# Patient Record
Sex: FEMALE | Race: ASIAN | Employment: OTHER | URBAN - METROPOLITAN AREA
[De-identification: names, ages, dates, MRNs, and addresses within clinical notes are randomized per-mention and may not be internally consistent; named-entity substitution may affect disease eponyms.]

---

## 2019-01-01 ENCOUNTER — APPOINTMENT (OUTPATIENT)
Dept: GENERAL RADIOLOGY | Age: 74
DRG: 871 | End: 2019-01-01
Attending: INTERNAL MEDICINE
Payer: MEDICARE

## 2019-01-01 ENCOUNTER — APPOINTMENT (OUTPATIENT)
Dept: GENERAL RADIOLOGY | Age: 74
End: 2019-01-01
Attending: INTERNAL MEDICINE

## 2019-01-01 ENCOUNTER — HOSPITAL ENCOUNTER (OUTPATIENT)
Age: 74
Discharge: ANOTHER HOSPITAL | End: 2019-10-14
Attending: INTERNAL MEDICINE | Admitting: INTERNAL MEDICINE

## 2019-01-01 ENCOUNTER — HOSPITAL ENCOUNTER (INPATIENT)
Age: 74
LOS: 5 days | DRG: 871 | End: 2019-10-19
Attending: INTERNAL MEDICINE | Admitting: INTERNAL MEDICINE
Payer: MEDICARE

## 2019-01-01 VITALS
TEMPERATURE: 97.7 F | HEIGHT: 63 IN | SYSTOLIC BLOOD PRESSURE: 59 MMHG | DIASTOLIC BLOOD PRESSURE: 39 MMHG | RESPIRATION RATE: 30 BRPM | BODY MASS INDEX: 35.94 KG/M2 | WEIGHT: 202.82 LBS | OXYGEN SATURATION: 74 %

## 2019-01-01 DIAGNOSIS — E87.0 HYPERNATREMIA: ICD-10-CM

## 2019-01-01 DIAGNOSIS — R50.9 FEVER, UNSPECIFIED FEVER CAUSE: ICD-10-CM

## 2019-01-01 DIAGNOSIS — I27.20 PULMONARY HYPERTENSION (HCC): Chronic | ICD-10-CM

## 2019-01-01 DIAGNOSIS — I48.91 NEW ONSET ATRIAL FIBRILLATION (HCC): ICD-10-CM

## 2019-01-01 DIAGNOSIS — M34.9 SCLERODERMA (HCC): Chronic | ICD-10-CM

## 2019-01-01 DIAGNOSIS — G62.81 CRITICAL ILLNESS POLYNEUROPATHY (HCC): ICD-10-CM

## 2019-01-01 DIAGNOSIS — G35 MULTIPLE SCLEROSIS (HCC): Chronic | ICD-10-CM

## 2019-01-01 DIAGNOSIS — J18.9 PNEUMONIA DUE TO INFECTIOUS ORGANISM, UNSPECIFIED LATERALITY, UNSPECIFIED PART OF LUNG: ICD-10-CM

## 2019-01-01 DIAGNOSIS — J96.01 ACUTE RESPIRATORY FAILURE WITH HYPOXIA (HCC): ICD-10-CM

## 2019-01-01 DIAGNOSIS — Z86.711 HISTORY OF PULMONARY EMBOLISM: ICD-10-CM

## 2019-01-01 DIAGNOSIS — N17.9 AKI (ACUTE KIDNEY INJURY) (HCC): ICD-10-CM

## 2019-01-01 DIAGNOSIS — G47.33 OSA (OBSTRUCTIVE SLEEP APNEA): Chronic | ICD-10-CM

## 2019-01-01 DIAGNOSIS — J80 ARDS (ADULT RESPIRATORY DISTRESS SYNDROME) (HCC): ICD-10-CM

## 2019-01-01 DIAGNOSIS — B34.8 RHINOVIRUS INFECTION: ICD-10-CM

## 2019-01-01 LAB
1,3 BETA GLUCAN SER-MCNC: 45 PG/ML
ABO + RH BLD: NORMAL
ADMINISTERED INITIALS, ADMINIT: NORMAL
ALBUMIN SERPL-MCNC: 1.5 G/DL (ref 3.2–4.6)
ALBUMIN SERPL-MCNC: 1.6 G/DL (ref 3.2–4.6)
ALBUMIN SERPL-MCNC: 1.7 G/DL (ref 3.2–4.6)
ALBUMIN/GLOB SERPL: 0.3 {RATIO} (ref 1.2–3.5)
ALP SERPL-CCNC: 129 U/L (ref 50–136)
ALP SERPL-CCNC: 151 U/L (ref 50–136)
ALP SERPL-CCNC: 152 U/L (ref 50–136)
ALP SERPL-CCNC: 171 U/L (ref 50–136)
ALP SERPL-CCNC: 197 U/L (ref 50–136)
ALT SERPL-CCNC: 10 U/L (ref 12–65)
ALT SERPL-CCNC: 11 U/L (ref 12–65)
ALT SERPL-CCNC: 11 U/L (ref 12–65)
ALT SERPL-CCNC: 12 U/L (ref 12–65)
ALT SERPL-CCNC: 8 U/L (ref 12–65)
AMORPH CRY URNS QL MICRO: ABNORMAL
AMORPH CRY URNS QL MICRO: ABNORMAL
ANION GAP SERPL CALC-SCNC: 10 MMOL/L (ref 7–16)
ANION GAP SERPL CALC-SCNC: 11 MMOL/L (ref 7–16)
ANION GAP SERPL CALC-SCNC: 11 MMOL/L (ref 7–16)
ANION GAP SERPL CALC-SCNC: 12 MMOL/L (ref 7–16)
ANION GAP SERPL CALC-SCNC: 12 MMOL/L (ref 7–16)
ANION GAP SERPL CALC-SCNC: 13 MMOL/L (ref 7–16)
ANION GAP SERPL CALC-SCNC: 5 MMOL/L (ref 7–16)
ANION GAP SERPL CALC-SCNC: 6 MMOL/L (ref 7–16)
ANION GAP SERPL CALC-SCNC: 7 MMOL/L (ref 7–16)
ANION GAP SERPL CALC-SCNC: 9 MMOL/L (ref 7–16)
ANION GAP SERPL CALC-SCNC: 9 MMOL/L (ref 7–16)
APPEARANCE UR: ABNORMAL
APPEARANCE UR: ABNORMAL
ARTERIAL PATENCY WRIST A: ABNORMAL
ARTERIAL PATENCY WRIST A: YES
AST SERPL-CCNC: 11 U/L (ref 15–37)
AST SERPL-CCNC: 27 U/L (ref 15–37)
AST SERPL-CCNC: 29 U/L (ref 15–37)
AST SERPL-CCNC: 35 U/L (ref 15–37)
AST SERPL-CCNC: 9 U/L (ref 15–37)
ATRIAL RATE: 78 BPM
BACTERIA SPEC CULT: ABNORMAL
BACTERIA SPEC CULT: NORMAL
BACTERIA URNS QL MICRO: 0 /HPF
BACTERIA URNS QL MICRO: ABNORMAL /HPF
BASE DEFICIT BLD-SCNC: 1 MMOL/L
BASE DEFICIT BLD-SCNC: 1 MMOL/L
BASE DEFICIT BLD-SCNC: 2 MMOL/L
BASE DEFICIT BLD-SCNC: 2 MMOL/L
BASE DEFICIT BLD-SCNC: 3 MMOL/L
BASE DEFICIT BLD-SCNC: 4 MMOL/L
BASE DEFICIT BLD-SCNC: 5 MMOL/L
BASE EXCESS BLD CALC-SCNC: 0 MMOL/L
BASE EXCESS BLD CALC-SCNC: 1 MMOL/L
BASOPHILS # BLD: 0 K/UL (ref 0–0.2)
BASOPHILS # BLD: 0.1 K/UL (ref 0–0.2)
BASOPHILS NFR BLD: 0 % (ref 0–2)
BDY SITE: ABNORMAL
BILIRUB SERPL-MCNC: 0.3 MG/DL (ref 0.2–1.1)
BILIRUB SERPL-MCNC: 0.4 MG/DL (ref 0.2–1.1)
BILIRUB SERPL-MCNC: 0.4 MG/DL (ref 0.2–1.1)
BILIRUB SERPL-MCNC: 0.5 MG/DL (ref 0.2–1.1)
BILIRUB SERPL-MCNC: 0.5 MG/DL (ref 0.2–1.1)
BILIRUB UR QL: NEGATIVE
BILIRUB UR QL: NEGATIVE
BLD PROD TYP BPU: NORMAL
BLD PROD TYP BPU: NORMAL
BLOOD GROUP ANTIBODIES SERPL: NORMAL
BNP SERPL-MCNC: 487 PG/ML
BODY TEMPERATURE: 98.6
BPU ID: NORMAL
BPU ID: NORMAL
BUN SERPL-MCNC: 104 MG/DL (ref 8–23)
BUN SERPL-MCNC: 116 MG/DL (ref 8–23)
BUN SERPL-MCNC: 137 MG/DL (ref 8–23)
BUN SERPL-MCNC: 143 MG/DL (ref 8–23)
BUN SERPL-MCNC: 148 MG/DL (ref 8–23)
BUN SERPL-MCNC: 149 MG/DL (ref 8–23)
BUN SERPL-MCNC: 161 MG/DL (ref 8–23)
BUN SERPL-MCNC: 165 MG/DL (ref 8–23)
BUN SERPL-MCNC: 172 MG/DL (ref 8–23)
BUN SERPL-MCNC: 68 MG/DL (ref 8–23)
BUN SERPL-MCNC: 86 MG/DL (ref 8–23)
BUN SERPL-MCNC: 97 MG/DL (ref 8–23)
C DIFF GDH STL QL: NORMAL
C DIFF TOX A+B STL QL IA: NORMAL
CALCIUM SERPL-MCNC: 6.1 MG/DL (ref 8.3–10.4)
CALCIUM SERPL-MCNC: 6.4 MG/DL (ref 8.3–10.4)
CALCIUM SERPL-MCNC: 6.4 MG/DL (ref 8.3–10.4)
CALCIUM SERPL-MCNC: 6.6 MG/DL (ref 8.3–10.4)
CALCIUM SERPL-MCNC: 6.7 MG/DL (ref 8.3–10.4)
CALCIUM SERPL-MCNC: 6.9 MG/DL (ref 8.3–10.4)
CALCIUM SERPL-MCNC: 7 MG/DL (ref 8.3–10.4)
CALCIUM SERPL-MCNC: 7.9 MG/DL (ref 8.3–10.4)
CALCIUM SERPL-MCNC: 8 MG/DL (ref 8.3–10.4)
CALCIUM SERPL-MCNC: 8 MG/DL (ref 8.3–10.4)
CALCIUM SERPL-MCNC: 8.1 MG/DL (ref 8.3–10.4)
CALCIUM SERPL-MCNC: 8.2 MG/DL (ref 8.3–10.4)
CALCIUM SERPL-MCNC: 8.3 MG/DL (ref 8.3–10.4)
CALCIUM SERPL-MCNC: 8.6 MG/DL (ref 8.3–10.4)
CALCULATED P AXIS, ECG09: 38 DEGREES
CALCULATED R AXIS, ECG10: 38 DEGREES
CALCULATED T AXIS, ECG11: 20 DEGREES
CHLORIDE SERPL-SCNC: 100 MMOL/L (ref 98–107)
CHLORIDE SERPL-SCNC: 102 MMOL/L (ref 98–107)
CHLORIDE SERPL-SCNC: 102 MMOL/L (ref 98–107)
CHLORIDE SERPL-SCNC: 104 MMOL/L (ref 98–107)
CHLORIDE SERPL-SCNC: 105 MMOL/L (ref 98–107)
CHLORIDE SERPL-SCNC: 107 MMOL/L (ref 98–107)
CHLORIDE SERPL-SCNC: 108 MMOL/L (ref 98–107)
CHLORIDE SERPL-SCNC: 109 MMOL/L (ref 98–107)
CHLORIDE SERPL-SCNC: 112 MMOL/L (ref 98–107)
CHLORIDE SERPL-SCNC: 113 MMOL/L (ref 98–107)
CHLORIDE SERPL-SCNC: 114 MMOL/L (ref 98–107)
CHLORIDE SERPL-SCNC: 114 MMOL/L (ref 98–107)
CHLORIDE SERPL-SCNC: 115 MMOL/L (ref 98–107)
CHLORIDE SERPL-SCNC: 115 MMOL/L (ref 98–107)
CHLORIDE SERPL-SCNC: 118 MMOL/L (ref 98–107)
CHLORIDE SERPL-SCNC: 119 MMOL/L (ref 98–107)
CLINICAL CONSIDERATION: NORMAL
CO2 BLD-SCNC: 24 MMOL/L
CO2 BLD-SCNC: 25 MMOL/L
CO2 BLD-SCNC: 27 MMOL/L
CO2 BLD-SCNC: 27 MMOL/L
CO2 BLD-SCNC: 28 MMOL/L
CO2 BLD-SCNC: 28 MMOL/L
CO2 BLD-SCNC: 29 MMOL/L
CO2 BLD-SCNC: 30 MMOL/L
CO2 BLD-SCNC: 31 MMOL/L
CO2 SERPL-SCNC: 18 MMOL/L (ref 21–32)
CO2 SERPL-SCNC: 21 MMOL/L (ref 21–32)
CO2 SERPL-SCNC: 22 MMOL/L (ref 21–32)
CO2 SERPL-SCNC: 23 MMOL/L (ref 21–32)
CO2 SERPL-SCNC: 24 MMOL/L (ref 21–32)
CO2 SERPL-SCNC: 26 MMOL/L (ref 21–32)
CO2 SERPL-SCNC: 26 MMOL/L (ref 21–32)
CO2 SERPL-SCNC: 27 MMOL/L (ref 21–32)
CO2 SERPL-SCNC: 28 MMOL/L (ref 21–32)
CO2 SERPL-SCNC: 31 MMOL/L (ref 21–32)
COLLECT TIME,HTIME: 1100
COLLECT TIME,HTIME: 1310
COLLECT TIME,HTIME: 1615
COLLECT TIME,HTIME: 1630
COLLECT TIME,HTIME: 1750
COLLECT TIME,HTIME: 322
COLLECT TIME,HTIME: 327
COLLECT TIME,HTIME: 330
COLLECT TIME,HTIME: 335
COLLECT TIME,HTIME: 35
COLLECT TIME,HTIME: 450
COLLECT TIME,HTIME: 504
COLLECT TIME,HTIME: 947
COLOR UR: YELLOW
COLOR UR: YELLOW
CREAT SERPL-MCNC: 1.21 MG/DL (ref 0.6–1)
CREAT SERPL-MCNC: 1.47 MG/DL (ref 0.6–1)
CREAT SERPL-MCNC: 1.49 MG/DL (ref 0.6–1)
CREAT SERPL-MCNC: 1.59 MG/DL (ref 0.6–1)
CREAT SERPL-MCNC: 1.76 MG/DL (ref 0.6–1)
CREAT SERPL-MCNC: 2.02 MG/DL (ref 0.6–1)
CREAT SERPL-MCNC: 2.25 MG/DL (ref 0.6–1)
CREAT SERPL-MCNC: 2.42 MG/DL (ref 0.6–1)
CREAT SERPL-MCNC: 2.46 MG/DL (ref 0.6–1)
CREAT SERPL-MCNC: 2.47 MG/DL (ref 0.6–1)
CREAT SERPL-MCNC: 2.5 MG/DL (ref 0.6–1)
CREAT SERPL-MCNC: 2.52 MG/DL (ref 0.6–1)
CREAT SERPL-MCNC: 2.54 MG/DL (ref 0.6–1)
CREAT SERPL-MCNC: 2.54 MG/DL (ref 0.6–1)
CREAT SERPL-MCNC: 2.61 MG/DL (ref 0.6–1)
CREAT SERPL-MCNC: 2.69 MG/DL (ref 0.6–1)
CROSSMATCH RESULT,%XM: NORMAL
CROSSMATCH RESULT,%XM: NORMAL
CRP SERPL-MCNC: 27.2 MG/DL (ref 0–0.9)
CRYPTOC AG SER QL LA: NEGATIVE
D50 ADMINISTERED, D50ADM: 0 ML
D50 ORDER, D50ORD: 0 ML
DIAGNOSIS, 93000: NORMAL
DIFFERENTIAL METHOD BLD: ABNORMAL
EOSINOPHIL # BLD: 0 K/UL (ref 0–0.8)
EOSINOPHIL # BLD: 0.2 K/UL (ref 0–0.8)
EOSINOPHIL NFR BLD: 0 % (ref 0.5–7.8)
EOSINOPHIL NFR BLD: 1 % (ref 0.5–7.8)
EPI CELLS #/AREA URNS HPF: ABNORMAL /HPF
EPI CELLS #/AREA URNS HPF: ABNORMAL /HPF
ERYTHROCYTE [DISTWIDTH] IN BLOOD BY AUTOMATED COUNT: 18.7 % (ref 11.9–14.6)
ERYTHROCYTE [DISTWIDTH] IN BLOOD BY AUTOMATED COUNT: 18.8 % (ref 11.9–14.6)
ERYTHROCYTE [DISTWIDTH] IN BLOOD BY AUTOMATED COUNT: 18.8 % (ref 11.9–14.6)
ERYTHROCYTE [DISTWIDTH] IN BLOOD BY AUTOMATED COUNT: 19.1 % (ref 11.9–14.6)
ERYTHROCYTE [DISTWIDTH] IN BLOOD BY AUTOMATED COUNT: 19.3 % (ref 11.9–14.6)
ERYTHROCYTE [DISTWIDTH] IN BLOOD BY AUTOMATED COUNT: 19.3 % (ref 11.9–14.6)
ERYTHROCYTE [DISTWIDTH] IN BLOOD BY AUTOMATED COUNT: 19.7 % (ref 11.9–14.6)
EST. AVERAGE GLUCOSE BLD GHB EST-MCNC: 120 MG/DL
EXHALED MINUTE VOLUME, VE: 11.2 L/MIN
EXHALED MINUTE VOLUME, VE: 6.5 L/MIN
EXHALED MINUTE VOLUME, VE: 7.1 L/MIN
EXHALED MINUTE VOLUME, VE: 8.1 L/MIN
EXHALED MINUTE VOLUME, VE: 8.5 L/MIN
EXHALED MINUTE VOLUME, VE: 8.5 L/MIN
EXHALED MINUTE VOLUME, VE: 8.6 L/MIN
EXHALED MINUTE VOLUME, VE: 9.4 L/MIN
EXHALED MINUTE VOLUME, VE: 9.5 L/MIN
FERRITIN SERPL-MCNC: 698 NG/ML (ref 8–388)
FOLATE SERPL-MCNC: 11.9 NG/ML (ref 3.1–17.5)
GAS FLOW.O2 O2 DELIVERY SYS: ABNORMAL L/MIN
GAS FLOW.O2 SETTING OXYMISER: 24 BPM
GAS FLOW.O2 SETTING OXYMISER: 25 BPM
GAS FLOW.O2 SETTING OXYMISER: 26 BPM
GAS FLOW.O2 SETTING OXYMISER: 30 BPM
GLOBULIN SER CALC-MCNC: 4.7 G/DL (ref 2.3–3.5)
GLOBULIN SER CALC-MCNC: 4.7 G/DL (ref 2.3–3.5)
GLOBULIN SER CALC-MCNC: 5.6 G/DL (ref 2.3–3.5)
GLOBULIN SER CALC-MCNC: 5.6 G/DL (ref 2.3–3.5)
GLOBULIN SER CALC-MCNC: 5.8 G/DL (ref 2.3–3.5)
GLSCOM COMMENTS: NORMAL
GLUCOSE BLD STRIP.AUTO-MCNC: 105 MG/DL (ref 65–100)
GLUCOSE BLD STRIP.AUTO-MCNC: 112 MG/DL (ref 65–100)
GLUCOSE BLD STRIP.AUTO-MCNC: 114 MG/DL (ref 65–100)
GLUCOSE BLD STRIP.AUTO-MCNC: 118 MG/DL (ref 65–100)
GLUCOSE BLD STRIP.AUTO-MCNC: 123 MG/DL (ref 65–100)
GLUCOSE BLD STRIP.AUTO-MCNC: 123 MG/DL (ref 65–100)
GLUCOSE BLD STRIP.AUTO-MCNC: 136 MG/DL (ref 65–100)
GLUCOSE BLD STRIP.AUTO-MCNC: 140 MG/DL (ref 65–100)
GLUCOSE BLD STRIP.AUTO-MCNC: 152 MG/DL (ref 65–100)
GLUCOSE BLD STRIP.AUTO-MCNC: 154 MG/DL (ref 65–100)
GLUCOSE BLD STRIP.AUTO-MCNC: 156 MG/DL (ref 65–100)
GLUCOSE BLD STRIP.AUTO-MCNC: 157 MG/DL (ref 65–100)
GLUCOSE BLD STRIP.AUTO-MCNC: 161 MG/DL (ref 65–100)
GLUCOSE BLD STRIP.AUTO-MCNC: 162 MG/DL (ref 65–100)
GLUCOSE BLD STRIP.AUTO-MCNC: 164 MG/DL (ref 65–100)
GLUCOSE BLD STRIP.AUTO-MCNC: 164 MG/DL (ref 65–100)
GLUCOSE BLD STRIP.AUTO-MCNC: 166 MG/DL (ref 65–100)
GLUCOSE BLD STRIP.AUTO-MCNC: 168 MG/DL (ref 65–100)
GLUCOSE BLD STRIP.AUTO-MCNC: 174 MG/DL (ref 65–100)
GLUCOSE BLD STRIP.AUTO-MCNC: 174 MG/DL (ref 65–100)
GLUCOSE BLD STRIP.AUTO-MCNC: 180 MG/DL (ref 65–100)
GLUCOSE BLD STRIP.AUTO-MCNC: 181 MG/DL (ref 65–100)
GLUCOSE BLD STRIP.AUTO-MCNC: 189 MG/DL (ref 65–100)
GLUCOSE BLD STRIP.AUTO-MCNC: 199 MG/DL (ref 65–100)
GLUCOSE BLD STRIP.AUTO-MCNC: 199 MG/DL (ref 65–100)
GLUCOSE BLD STRIP.AUTO-MCNC: 207 MG/DL (ref 65–100)
GLUCOSE BLD STRIP.AUTO-MCNC: 220 MG/DL (ref 65–100)
GLUCOSE BLD STRIP.AUTO-MCNC: 246 MG/DL (ref 65–100)
GLUCOSE BLD STRIP.AUTO-MCNC: 247 MG/DL (ref 65–100)
GLUCOSE BLD STRIP.AUTO-MCNC: 257 MG/DL (ref 65–100)
GLUCOSE BLD STRIP.AUTO-MCNC: 270 MG/DL (ref 65–100)
GLUCOSE BLD STRIP.AUTO-MCNC: 291 MG/DL (ref 65–100)
GLUCOSE BLD STRIP.AUTO-MCNC: 291 MG/DL (ref 65–100)
GLUCOSE BLD STRIP.AUTO-MCNC: 299 MG/DL (ref 65–100)
GLUCOSE BLD STRIP.AUTO-MCNC: 303 MG/DL (ref 65–100)
GLUCOSE BLD STRIP.AUTO-MCNC: 314 MG/DL (ref 65–100)
GLUCOSE BLD STRIP.AUTO-MCNC: 329 MG/DL (ref 65–100)
GLUCOSE BLD STRIP.AUTO-MCNC: 331 MG/DL (ref 65–100)
GLUCOSE BLD STRIP.AUTO-MCNC: 352 MG/DL (ref 65–100)
GLUCOSE BLD STRIP.AUTO-MCNC: 390 MG/DL (ref 65–100)
GLUCOSE BLD STRIP.AUTO-MCNC: 397 MG/DL (ref 65–100)
GLUCOSE BLD STRIP.AUTO-MCNC: 410 MG/DL (ref 65–100)
GLUCOSE BLD STRIP.AUTO-MCNC: 449 MG/DL (ref 65–100)
GLUCOSE BLD STRIP.AUTO-MCNC: 462 MG/DL (ref 65–100)
GLUCOSE BLD STRIP.AUTO-MCNC: 80 MG/DL (ref 65–100)
GLUCOSE SERPL-MCNC: 134 MG/DL (ref 65–100)
GLUCOSE SERPL-MCNC: 187 MG/DL (ref 65–100)
GLUCOSE SERPL-MCNC: 209 MG/DL (ref 65–100)
GLUCOSE SERPL-MCNC: 211 MG/DL (ref 65–100)
GLUCOSE SERPL-MCNC: 219 MG/DL (ref 65–100)
GLUCOSE SERPL-MCNC: 223 MG/DL (ref 65–100)
GLUCOSE SERPL-MCNC: 235 MG/DL (ref 65–100)
GLUCOSE SERPL-MCNC: 247 MG/DL (ref 65–100)
GLUCOSE SERPL-MCNC: 266 MG/DL (ref 65–100)
GLUCOSE SERPL-MCNC: 270 MG/DL (ref 65–100)
GLUCOSE SERPL-MCNC: 296 MG/DL (ref 65–100)
GLUCOSE SERPL-MCNC: 304 MG/DL (ref 65–100)
GLUCOSE SERPL-MCNC: 315 MG/DL (ref 65–100)
GLUCOSE SERPL-MCNC: 322 MG/DL (ref 65–100)
GLUCOSE SERPL-MCNC: 345 MG/DL (ref 65–100)
GLUCOSE SERPL-MCNC: 369 MG/DL (ref 65–100)
GLUCOSE UR STRIP.AUTO-MCNC: NEGATIVE MG/DL
GLUCOSE UR STRIP.AUTO-MCNC: NEGATIVE MG/DL
GLUCOSE, GLC: 105 MG/DL
GLUCOSE, GLC: 112 MG/DL
GLUCOSE, GLC: 114 MG/DL
GLUCOSE, GLC: 118 MG/DL
GLUCOSE, GLC: 123 MG/DL
GLUCOSE, GLC: 136 MG/DL
GLUCOSE, GLC: 140 MG/DL
GLUCOSE, GLC: 154 MG/DL
GLUCOSE, GLC: 162 MG/DL
GLUCOSE, GLC: 164 MG/DL
GLUCOSE, GLC: 168 MG/DL
GLUCOSE, GLC: 174 MG/DL
GLUCOSE, GLC: 180 MG/DL
GLUCOSE, GLC: 196 MG/DL
GLUCOSE, GLC: 199 MG/DL
GLUCOSE, GLC: 199 MG/DL
GLUCOSE, GLC: 246 MG/DL
GLUCOSE, GLC: 257 MG/DL
GLUCOSE, GLC: 270 MG/DL
GLUCOSE, GLC: 291 MG/DL
GLUCOSE, GLC: 291 MG/DL
GLUCOSE, GLC: 307 MG/DL
GLUCOSE, GLC: 329 MG/DL
GLUCOSE, GLC: 352 MG/DL
GLUCOSE, GLC: 390 MG/DL
GLUCOSE, GLC: 410 MG/DL
GLUCOSE, GLC: 449 MG/DL
GLUCOSE, GLC: 462 MG/DL
GLUCOSE, GLC: 80 MG/DL
GRAM STN SPEC: NORMAL
HBA1C MFR BLD: 5.8 % (ref 4.8–6)
HCO3 BLD-SCNC: 23.1 MMOL/L (ref 22–26)
HCO3 BLD-SCNC: 23.1 MMOL/L (ref 22–26)
HCO3 BLD-SCNC: 23.3 MMOL/L (ref 22–26)
HCO3 BLD-SCNC: 23.3 MMOL/L (ref 22–26)
HCO3 BLD-SCNC: 23.5 MMOL/L (ref 22–26)
HCO3 BLD-SCNC: 23.7 MMOL/L (ref 22–26)
HCO3 BLD-SCNC: 24.9 MMOL/L (ref 22–26)
HCO3 BLD-SCNC: 25.2 MMOL/L (ref 22–26)
HCO3 BLD-SCNC: 25.7 MMOL/L (ref 22–26)
HCO3 BLD-SCNC: 26 MMOL/L (ref 22–26)
HCO3 BLD-SCNC: 26.1 MMOL/L (ref 22–26)
HCO3 BLD-SCNC: 27.8 MMOL/L (ref 22–26)
HCO3 BLD-SCNC: 28 MMOL/L (ref 22–26)
HCT VFR BLD AUTO: 20.6 % (ref 35.8–46.3)
HCT VFR BLD AUTO: 22.5 % (ref 35.8–46.3)
HCT VFR BLD AUTO: 25.5 % (ref 35.8–46.3)
HCT VFR BLD AUTO: 25.9 % (ref 35.8–46.3)
HCT VFR BLD AUTO: 26.6 % (ref 35.8–46.3)
HCT VFR BLD AUTO: 27.5 % (ref 35.8–46.3)
HCT VFR BLD AUTO: 28.5 % (ref 35.8–46.3)
HCT VFR BLD AUTO: 29.4 % (ref 35.8–46.3)
HCT VFR BLD AUTO: 33.7 % (ref 35.8–46.3)
HGB BLD-MCNC: 6.1 G/DL (ref 11.7–15.4)
HGB BLD-MCNC: 6.5 G/DL (ref 11.7–15.4)
HGB BLD-MCNC: 7.6 G/DL (ref 11.7–15.4)
HGB BLD-MCNC: 7.6 G/DL (ref 11.7–15.4)
HGB BLD-MCNC: 7.7 G/DL (ref 11.7–15.4)
HGB BLD-MCNC: 8.3 G/DL (ref 11.7–15.4)
HGB BLD-MCNC: 8.5 G/DL (ref 11.7–15.4)
HGB BLD-MCNC: 9.3 G/DL (ref 11.7–15.4)
HGB BLD-MCNC: 9.6 G/DL (ref 11.7–15.4)
HGB UR QL STRIP: ABNORMAL
HGB UR QL STRIP: ABNORMAL
HIGH TARGET, HITG: 180 MG/DL
IMM GRANULOCYTES # BLD AUTO: 0.1 K/UL (ref 0–0.5)
IMM GRANULOCYTES # BLD AUTO: 0.1 K/UL (ref 0–0.5)
IMM GRANULOCYTES # BLD AUTO: 0.2 K/UL (ref 0–0.5)
IMM GRANULOCYTES # BLD AUTO: 0.4 K/UL (ref 0–0.5)
IMM GRANULOCYTES NFR BLD AUTO: 1 % (ref 0–5)
IMM GRANULOCYTES NFR BLD AUTO: 2 % (ref 0–5)
INR PPP: 1.4
INSPIRATION.DURATION SETTING TIME VENT: 0.7 SEC
INSPIRATION.DURATION SETTING TIME VENT: 0.8 SEC
INSULIN ADMINSTERED, INSADM: 0.3 UNITS/HOUR
INSULIN ADMINSTERED, INSADM: 1 UNITS/HOUR
INSULIN ADMINSTERED, INSADM: 1.5 UNITS/HOUR
INSULIN ADMINSTERED, INSADM: 1.5 UNITS/HOUR
INSULIN ADMINSTERED, INSADM: 1.8 UNITS/HOUR
INSULIN ADMINSTERED, INSADM: 10.8 UNITS/HOUR
INSULIN ADMINSTERED, INSADM: 11.1 UNITS/HOUR
INSULIN ADMINSTERED, INSADM: 11.6 UNITS/HOUR
INSULIN ADMINSTERED, INSADM: 11.7 UNITS/HOUR
INSULIN ADMINSTERED, INSADM: 13 UNITS/HOUR
INSULIN ADMINSTERED, INSADM: 13.4 UNITS/HOUR
INSULIN ADMINSTERED, INSADM: 13.9 UNITS/HOUR
INSULIN ADMINSTERED, INSADM: 14 UNITS/HOUR
INSULIN ADMINSTERED, INSADM: 16.5 UNITS/HOUR
INSULIN ADMINSTERED, INSADM: 17.7 UNITS/HOUR
INSULIN ADMINSTERED, INSADM: 18.8 UNITS/HOUR
INSULIN ADMINSTERED, INSADM: 2.4 UNITS/HOUR
INSULIN ADMINSTERED, INSADM: 2.6 UNITS/HOUR
INSULIN ADMINSTERED, INSADM: 2.7 UNITS/HOUR
INSULIN ADMINSTERED, INSADM: 2.7 UNITS/HOUR
INSULIN ADMINSTERED, INSADM: 2.9 UNITS/HOUR
INSULIN ADMINSTERED, INSADM: 3.6 UNITS/HOUR
INSULIN ADMINSTERED, INSADM: 3.9 UNITS/HOUR
INSULIN ADMINSTERED, INSADM: 5.5 UNITS/HOUR
INSULIN ADMINSTERED, INSADM: 6.3 UNITS/HOUR
INSULIN ADMINSTERED, INSADM: 8 UNITS/HOUR
INSULIN ADMINSTERED, INSADM: 8.9 UNITS/HOUR
INSULIN ADMINSTERED, INSADM: 9.2 UNITS/HOUR
INSULIN ADMINSTERED, INSADM: 9.7 UNITS/HOUR
INSULIN ORDER, INSORD: 0.3 UNITS/HOUR
INSULIN ORDER, INSORD: 1 UNITS/HOUR
INSULIN ORDER, INSORD: 1.5 UNITS/HOUR
INSULIN ORDER, INSORD: 1.5 UNITS/HOUR
INSULIN ORDER, INSORD: 1.8 UNITS/HOUR
INSULIN ORDER, INSORD: 10.8 UNITS/HOUR
INSULIN ORDER, INSORD: 11.1 UNITS/HOUR
INSULIN ORDER, INSORD: 11.6 UNITS/HOUR
INSULIN ORDER, INSORD: 11.7 UNITS/HOUR
INSULIN ORDER, INSORD: 13 UNITS/HOUR
INSULIN ORDER, INSORD: 13.4 UNITS/HOUR
INSULIN ORDER, INSORD: 13.9 UNITS/HOUR
INSULIN ORDER, INSORD: 14 UNITS/HOUR
INSULIN ORDER, INSORD: 16.5 UNITS/HOUR
INSULIN ORDER, INSORD: 17.7 UNITS/HOUR
INSULIN ORDER, INSORD: 18.8 UNITS/HOUR
INSULIN ORDER, INSORD: 2.4 UNITS/HOUR
INSULIN ORDER, INSORD: 2.6 UNITS/HOUR
INSULIN ORDER, INSORD: 2.7 UNITS/HOUR
INSULIN ORDER, INSORD: 2.7 UNITS/HOUR
INSULIN ORDER, INSORD: 2.9 UNITS/HOUR
INSULIN ORDER, INSORD: 3.6 UNITS/HOUR
INSULIN ORDER, INSORD: 3.9 UNITS/HOUR
INSULIN ORDER, INSORD: 5.5 UNITS/HOUR
INSULIN ORDER, INSORD: 6.3 UNITS/HOUR
INSULIN ORDER, INSORD: 8 UNITS/HOUR
INSULIN ORDER, INSORD: 8.9 UNITS/HOUR
INSULIN ORDER, INSORD: 9.2 UNITS/HOUR
INSULIN ORDER, INSORD: 9.7 UNITS/HOUR
INTERPRETATION: NORMAL
KETONES UR QL STRIP.AUTO: NEGATIVE MG/DL
KETONES UR QL STRIP.AUTO: NEGATIVE MG/DL
LACTATE SERPL-SCNC: 1.4 MMOL/L (ref 0.4–2)
LDLC SERPL DIRECT ASSAY-MCNC: 65 MG/DL
LEUKOCYTE ESTERASE UR QL STRIP.AUTO: ABNORMAL
LEUKOCYTE ESTERASE UR QL STRIP.AUTO: ABNORMAL
LOW TARGET, LOT: 140 MG/DL
LYMPHOCYTES # BLD: 0.4 K/UL (ref 0.5–4.6)
LYMPHOCYTES # BLD: 0.5 K/UL (ref 0.5–4.6)
LYMPHOCYTES # BLD: 0.6 K/UL (ref 0.5–4.6)
LYMPHOCYTES # BLD: 0.8 K/UL (ref 0.5–4.6)
LYMPHOCYTES NFR BLD: 3 % (ref 13–44)
LYMPHOCYTES NFR BLD: 3 % (ref 13–44)
LYMPHOCYTES NFR BLD: 4 % (ref 13–44)
LYMPHOCYTES NFR BLD: 4 % (ref 13–44)
MAGNESIUM SERPL-MCNC: 1.7 MG/DL (ref 1.8–2.4)
MAGNESIUM SERPL-MCNC: 1.8 MG/DL (ref 1.8–2.4)
MAGNESIUM SERPL-MCNC: 1.9 MG/DL (ref 1.8–2.4)
MAGNESIUM SERPL-MCNC: 2 MG/DL (ref 1.8–2.4)
MAGNESIUM SERPL-MCNC: 2 MG/DL (ref 1.8–2.4)
MAGNESIUM SERPL-MCNC: 2.1 MG/DL (ref 1.8–2.4)
MAGNESIUM SERPL-MCNC: 2.3 MG/DL (ref 1.8–2.4)
MAGNESIUM SERPL-MCNC: 2.5 MG/DL (ref 1.8–2.4)
MAGNESIUM SERPL-MCNC: 2.7 MG/DL (ref 1.8–2.4)
MAGNESIUM SERPL-MCNC: 2.8 MG/DL (ref 1.8–2.4)
MCH RBC QN AUTO: 25.5 PG (ref 26.1–32.9)
MCH RBC QN AUTO: 25.6 PG (ref 26.1–32.9)
MCH RBC QN AUTO: 25.7 PG (ref 26.1–32.9)
MCH RBC QN AUTO: 25.9 PG (ref 26.1–32.9)
MCH RBC QN AUTO: 26 PG (ref 26.1–32.9)
MCH RBC QN AUTO: 26 PG (ref 26.1–32.9)
MCH RBC QN AUTO: 26.1 PG (ref 26.1–32.9)
MCHC RBC AUTO-ENTMCNC: 28.2 G/DL (ref 31.4–35)
MCHC RBC AUTO-ENTMCNC: 28.5 G/DL (ref 31.4–35)
MCHC RBC AUTO-ENTMCNC: 28.9 G/DL (ref 31.4–35)
MCHC RBC AUTO-ENTMCNC: 28.9 G/DL (ref 31.4–35)
MCHC RBC AUTO-ENTMCNC: 29.3 G/DL (ref 31.4–35)
MCHC RBC AUTO-ENTMCNC: 29.8 G/DL (ref 31.4–35)
MCHC RBC AUTO-ENTMCNC: 29.8 G/DL (ref 31.4–35)
MCV RBC AUTO: 86.7 FL (ref 79.6–97.8)
MCV RBC AUTO: 86.9 FL (ref 79.6–97.8)
MCV RBC AUTO: 87.4 FL (ref 79.6–97.8)
MCV RBC AUTO: 88.6 FL (ref 79.6–97.8)
MCV RBC AUTO: 88.7 FL (ref 79.6–97.8)
MCV RBC AUTO: 91.3 FL (ref 79.6–97.8)
MCV RBC AUTO: 92.2 FL (ref 79.6–97.8)
MINUTES UNTIL NEXT BG, NBG: 60 MIN
MONOCYTES # BLD: 0.2 K/UL (ref 0.1–1.3)
MONOCYTES # BLD: 0.5 K/UL (ref 0.1–1.3)
MONOCYTES # BLD: 0.6 K/UL (ref 0.1–1.3)
MONOCYTES # BLD: 0.6 K/UL (ref 0.1–1.3)
MONOCYTES NFR BLD: 1 % (ref 4–12)
MONOCYTES NFR BLD: 3 % (ref 4–12)
MONOCYTES NFR BLD: 3 % (ref 4–12)
MONOCYTES NFR BLD: 5 % (ref 4–12)
MULTIPLIER, MUL: 0.02
MULTIPLIER, MUL: 0.03
MULTIPLIER, MUL: 0.04
MULTIPLIER, MUL: 0.05
MULTIPLIER, MUL: 0.06
MULTIPLIER, MUL: 0.06
MULTIPLIER, MUL: 0.07
MULTIPLIER, MUL: 0.08
MULTIPLIER, MUL: 0.09
NEUTS SEG # BLD: 10.9 K/UL (ref 1.7–8.2)
NEUTS SEG # BLD: 15.1 K/UL (ref 1.7–8.2)
NEUTS SEG # BLD: 17.1 K/UL (ref 1.7–8.2)
NEUTS SEG # BLD: 19.4 K/UL (ref 1.7–8.2)
NEUTS SEG NFR BLD: 90 % (ref 43–78)
NEUTS SEG NFR BLD: 90 % (ref 43–78)
NEUTS SEG NFR BLD: 92 % (ref 43–78)
NEUTS SEG NFR BLD: 95 % (ref 43–78)
NITRITE UR QL STRIP.AUTO: NEGATIVE
NITRITE UR QL STRIP.AUTO: NEGATIVE
NRBC # BLD: 0.05 K/UL (ref 0–0.2)
NRBC # BLD: 0.05 K/UL (ref 0–0.2)
NRBC # BLD: 0.06 K/UL (ref 0–0.2)
NRBC # BLD: 0.09 K/UL (ref 0–0.2)
NRBC # BLD: 0.12 K/UL (ref 0–0.2)
NRBC # BLD: 0.13 K/UL (ref 0–0.2)
NRBC # BLD: 0.14 K/UL (ref 0–0.2)
O2/TOTAL GAS SETTING VFR VENT: 100 %
O2/TOTAL GAS SETTING VFR VENT: 100 %
O2/TOTAL GAS SETTING VFR VENT: 70 %
O2/TOTAL GAS SETTING VFR VENT: 75 %
O2/TOTAL GAS SETTING VFR VENT: 75 %
O2/TOTAL GAS SETTING VFR VENT: 80 %
O2/TOTAL GAS SETTING VFR VENT: 85 %
O2/TOTAL GAS SETTING VFR VENT: 90 %
ORDER INITIALS, ORDINIT: NORMAL
OTHER OBSERVATIONS,UCOM: ABNORMAL
OTHER OBSERVATIONS,UCOM: ABNORMAL
P-R INTERVAL, ECG05: 158 MS
PCO2 BLD: 112.4 MMHG (ref 35–45)
PCO2 BLD: 31.9 MMHG (ref 35–45)
PCO2 BLD: 45.1 MMHG (ref 35–45)
PCO2 BLD: 48.6 MMHG (ref 35–45)
PCO2 BLD: 50 MMHG (ref 35–45)
PCO2 BLD: 52.8 MMHG (ref 35–45)
PCO2 BLD: 58.9 MMHG (ref 35–45)
PCO2 BLD: 60.3 MMHG (ref 35–45)
PCO2 BLD: 60.6 MMHG (ref 35–45)
PCO2 BLD: 61.8 MMHG (ref 35–45)
PCO2 BLD: 62.9 MMHG (ref 35–45)
PCO2 BLD: 64.4 MMHG (ref 35–45)
PCO2 BLD: 82.8 MMHG (ref 35–45)
PCR REFLEX: NORMAL
PEEP RESPIRATORY: 10 CMH2O
PEEP RESPIRATORY: 15 CMH2O
PEEP RESPIRATORY: 15 CMH2O
PH BLD: 7 [PH] (ref 7.35–7.45)
PH BLD: 7.11 [PH] (ref 7.35–7.45)
PH BLD: 7.18 [PH] (ref 7.35–7.45)
PH BLD: 7.19 [PH] (ref 7.35–7.45)
PH BLD: 7.21 [PH] (ref 7.35–7.45)
PH BLD: 7.21 [PH] (ref 7.35–7.45)
PH BLD: 7.25 [PH] (ref 7.35–7.45)
PH BLD: 7.26 [PH] (ref 7.35–7.45)
PH BLD: 7.27 [PH] (ref 7.35–7.45)
PH BLD: 7.28 [PH] (ref 7.35–7.45)
PH BLD: 7.32 [PH] (ref 7.35–7.45)
PH BLD: 7.33 [PH] (ref 7.35–7.45)
PH BLD: 7.47 [PH] (ref 7.35–7.45)
PH UR STRIP: 5.5 [PH] (ref 5–9)
PH UR STRIP: 5.5 [PH] (ref 5–9)
PHOSPHATE SERPL-MCNC: 3.7 MG/DL (ref 2.3–3.7)
PHOSPHATE SERPL-MCNC: 4.8 MG/DL (ref 2.3–3.7)
PHOSPHATE SERPL-MCNC: 5.1 MG/DL (ref 2.3–3.7)
PLATELET # BLD AUTO: 180 K/UL (ref 150–450)
PLATELET # BLD AUTO: 203 K/UL (ref 150–450)
PLATELET # BLD AUTO: 222 K/UL (ref 150–450)
PLATELET # BLD AUTO: 241 K/UL (ref 150–450)
PLATELET # BLD AUTO: 249 K/UL (ref 150–450)
PLATELET # BLD AUTO: 277 K/UL (ref 150–450)
PLATELET # BLD AUTO: 326 K/UL (ref 150–450)
PLATELET COMMENTS,PCOM: ADEQUATE
PMV BLD AUTO: 10.1 FL (ref 9.4–12.3)
PMV BLD AUTO: 10.2 FL (ref 9.4–12.3)
PMV BLD AUTO: 10.3 FL (ref 9.4–12.3)
PMV BLD AUTO: 10.4 FL (ref 9.4–12.3)
PMV BLD AUTO: 10.5 FL (ref 9.4–12.3)
PMV BLD AUTO: 10.6 FL (ref 9.4–12.3)
PMV BLD AUTO: 11.2 FL (ref 9.4–12.3)
PO2 BLD: 106 MMHG (ref 75–100)
PO2 BLD: 111 MMHG (ref 75–100)
PO2 BLD: 119 MMHG (ref 75–100)
PO2 BLD: 144 MMHG (ref 75–100)
PO2 BLD: 335 MMHG (ref 75–100)
PO2 BLD: 60 MMHG (ref 75–100)
PO2 BLD: 62 MMHG (ref 75–100)
PO2 BLD: 63 MMHG (ref 75–100)
PO2 BLD: 68 MMHG (ref 75–100)
PO2 BLD: 76 MMHG (ref 75–100)
PO2 BLD: 82 MMHG (ref 75–100)
PO2 BLD: 84 MMHG (ref 75–100)
PO2 BLD: 97 MMHG (ref 75–100)
POTASSIUM SERPL-SCNC: 3.5 MMOL/L (ref 3.5–5.1)
POTASSIUM SERPL-SCNC: 3.5 MMOL/L (ref 3.5–5.1)
POTASSIUM SERPL-SCNC: 3.9 MMOL/L (ref 3.5–5.1)
POTASSIUM SERPL-SCNC: 3.9 MMOL/L (ref 3.5–5.1)
POTASSIUM SERPL-SCNC: 4 MMOL/L (ref 3.5–5.1)
POTASSIUM SERPL-SCNC: 4.2 MMOL/L (ref 3.5–5.1)
POTASSIUM SERPL-SCNC: 4.2 MMOL/L (ref 3.5–5.1)
POTASSIUM SERPL-SCNC: 4.5 MMOL/L (ref 3.5–5.1)
POTASSIUM SERPL-SCNC: 4.6 MMOL/L (ref 3.5–5.1)
POTASSIUM SERPL-SCNC: 4.7 MMOL/L (ref 3.5–5.1)
POTASSIUM SERPL-SCNC: 4.9 MMOL/L (ref 3.5–5.1)
PRESSURE CONTROL, IPC: 28
PROCALCITONIN SERPL-MCNC: 0.3 NG/ML
PROCALCITONIN SERPL-MCNC: 14 NG/ML
PROCALCITONIN SERPL-MCNC: 14.5 NG/ML
PROT SERPL-MCNC: 6.3 G/DL (ref 6.3–8.2)
PROT SERPL-MCNC: 6.3 G/DL (ref 6.3–8.2)
PROT SERPL-MCNC: 7.1 G/DL (ref 6.3–8.2)
PROT SERPL-MCNC: 7.3 G/DL (ref 6.3–8.2)
PROT SERPL-MCNC: 7.4 G/DL (ref 6.3–8.2)
PROT UR STRIP-MCNC: 100 MG/DL
PROT UR STRIP-MCNC: 30 MG/DL
PROTHROMBIN TIME: 18 SEC (ref 11.7–14.5)
Q-T INTERVAL, ECG07: 390 MS
QRS DURATION, ECG06: 86 MS
QTC CALCULATION (BEZET), ECG08: 444 MS
RBC # BLD AUTO: 2.54 M/UL (ref 4.05–5.2)
RBC # BLD AUTO: 2.94 M/UL (ref 4.05–5.2)
RBC # BLD AUTO: 2.98 M/UL (ref 4.05–5.2)
RBC # BLD AUTO: 3 M/UL (ref 4.05–5.2)
RBC # BLD AUTO: 3.19 M/UL (ref 4.05–5.2)
RBC # BLD AUTO: 3.26 M/UL (ref 4.05–5.2)
RBC # BLD AUTO: 3.69 M/UL (ref 4.05–5.2)
RBC #/AREA URNS HPF: ABNORMAL /HPF
RBC #/AREA URNS HPF: ABNORMAL /HPF
RBC MORPH BLD: ABNORMAL
SAO2 % BLD: 100 % (ref 95–98)
SAO2 % BLD: 84 % (ref 95–98)
SAO2 % BLD: 85 % (ref 95–98)
SAO2 % BLD: 85 % (ref 95–98)
SAO2 % BLD: 88 % (ref 95–98)
SAO2 % BLD: 90 % (ref 95–98)
SAO2 % BLD: 94 % (ref 95–98)
SAO2 % BLD: 94 % (ref 95–98)
SAO2 % BLD: 96 % (ref 95–98)
SAO2 % BLD: 97 % (ref 95–98)
SAO2 % BLD: 98 % (ref 95–98)
SAO2 % BLD: 98 % (ref 95–98)
SAO2 % BLD: 99 % (ref 95–98)
SERVICE CMNT-IMP: ABNORMAL
SERVICE CMNT-IMP: NORMAL
SODIUM SERPL-SCNC: 131 MMOL/L (ref 136–145)
SODIUM SERPL-SCNC: 134 MMOL/L (ref 136–145)
SODIUM SERPL-SCNC: 135 MMOL/L (ref 136–145)
SODIUM SERPL-SCNC: 137 MMOL/L (ref 136–145)
SODIUM SERPL-SCNC: 138 MMOL/L (ref 136–145)
SODIUM SERPL-SCNC: 141 MMOL/L (ref 136–145)
SODIUM SERPL-SCNC: 143 MMOL/L (ref 136–145)
SODIUM SERPL-SCNC: 143 MMOL/L (ref 136–145)
SODIUM SERPL-SCNC: 146 MMOL/L (ref 136–145)
SODIUM SERPL-SCNC: 147 MMOL/L (ref 136–145)
SODIUM SERPL-SCNC: 147 MMOL/L (ref 136–145)
SODIUM SERPL-SCNC: 149 MMOL/L (ref 136–145)
SODIUM SERPL-SCNC: 149 MMOL/L (ref 136–145)
SODIUM SERPL-SCNC: 150 MMOL/L (ref 136–145)
SODIUM SERPL-SCNC: 150 MMOL/L (ref 136–145)
SODIUM SERPL-SCNC: 152 MMOL/L (ref 136–145)
SODIUM SERPL-SCNC: 152 MMOL/L (ref 136–145)
SP GR UR REFRACTOMETRY: 1.01 (ref 1–1.02)
SP GR UR REFRACTOMETRY: 1.02 (ref 1–1.02)
SPECIMEN EXP DATE BLD: NORMAL
SPECIMEN TYPE: ABNORMAL
STATUS OF UNIT,%ST: NORMAL
STATUS OF UNIT,%ST: NORMAL
T4 SERPL-MCNC: 7.9 UG/DL (ref 4.8–13.9)
TRIGL SERPL-MCNC: 269 MG/DL (ref 35–150)
TRIGL SERPL-MCNC: 310 MG/DL (ref 35–150)
TRIGL SERPL-MCNC: 643 MG/DL (ref 35–150)
TSH SERPL DL<=0.005 MIU/L-ACNC: 0.23 UIU/ML (ref 0.36–3.74)
UNIT DIVISION, %UDIV: 0
UNIT DIVISION, %UDIV: 0
UROBILINOGEN UR QL STRIP.AUTO: 0.2 EU/DL (ref 0.2–1)
UROBILINOGEN UR QL STRIP.AUTO: 0.2 EU/DL (ref 0.2–1)
VENTILATION MODE VENT: ABNORMAL
VENTRICULAR RATE, ECG03: 78 BPM
VIT B12 SERPL-MCNC: 1030 PG/ML (ref 193–986)
VT SETTING VENT: 287 ML
VT SETTING VENT: 310 ML
VT SETTING VENT: 350 ML
WBC # BLD AUTO: 12.1 K/UL (ref 4.3–11.1)
WBC # BLD AUTO: 14.5 K/UL (ref 4.3–11.1)
WBC # BLD AUTO: 14.7 K/UL (ref 4.3–11.1)
WBC # BLD AUTO: 15.6 K/UL (ref 4.3–11.1)
WBC # BLD AUTO: 16.4 K/UL (ref 4.3–11.1)
WBC # BLD AUTO: 18 K/UL (ref 4.3–11.1)
WBC # BLD AUTO: 21.5 K/UL (ref 4.3–11.1)
WBC MORPH BLD: ABNORMAL
WBC URNS QL MICRO: ABNORMAL /HPF
WBC URNS QL MICRO: ABNORMAL /HPF
YEAST URNS QL MICRO: ABNORMAL

## 2019-01-01 PROCEDURE — 65610000001 HC ROOM ICU GENERAL

## 2019-01-01 PROCEDURE — 74011000250 HC RX REV CODE- 250: Performed by: NURSE PRACTITIONER

## 2019-01-01 PROCEDURE — 85027 COMPLETE CBC AUTOMATED: CPT

## 2019-01-01 PROCEDURE — 80053 COMPREHEN METABOLIC PANEL: CPT

## 2019-01-01 PROCEDURE — 74011636637 HC RX REV CODE- 636/637: Performed by: NURSE PRACTITIONER

## 2019-01-01 PROCEDURE — 83880 ASSAY OF NATRIURETIC PEPTIDE: CPT

## 2019-01-01 PROCEDURE — 74011000250 HC RX REV CODE- 250: Performed by: INTERNAL MEDICINE

## 2019-01-01 PROCEDURE — 36415 COLL VENOUS BLD VENIPUNCTURE: CPT

## 2019-01-01 PROCEDURE — 86900 BLOOD TYPING SEROLOGIC ABO: CPT

## 2019-01-01 PROCEDURE — 30243N1 TRANSFUSION OF NONAUTOLOGOUS RED BLOOD CELLS INTO CENTRAL VEIN, PERCUTANEOUS APPROACH: ICD-10-PCS | Performed by: INTERNAL MEDICINE

## 2019-01-01 PROCEDURE — 83735 ASSAY OF MAGNESIUM: CPT

## 2019-01-01 PROCEDURE — 82803 BLOOD GASES ANY COMBINATION: CPT

## 2019-01-01 PROCEDURE — 74011636637 HC RX REV CODE- 636/637: Performed by: INTERNAL MEDICINE

## 2019-01-01 PROCEDURE — 82962 GLUCOSE BLOOD TEST: CPT

## 2019-01-01 PROCEDURE — 74011000250 HC RX REV CODE- 250

## 2019-01-01 PROCEDURE — 77030034850

## 2019-01-01 PROCEDURE — 71045 X-RAY EXAM CHEST 1 VIEW: CPT

## 2019-01-01 PROCEDURE — 74011250636 HC RX REV CODE- 250/636: Performed by: NURSE PRACTITIONER

## 2019-01-01 PROCEDURE — 80048 BASIC METABOLIC PNL TOTAL CA: CPT

## 2019-01-01 PROCEDURE — 84100 ASSAY OF PHOSPHORUS: CPT

## 2019-01-01 PROCEDURE — 81001 URINALYSIS AUTO W/SCOPE: CPT

## 2019-01-01 PROCEDURE — 74011250636 HC RX REV CODE- 250/636: Performed by: INTERNAL MEDICINE

## 2019-01-01 PROCEDURE — 77030013794 HC KT TRNSDUC BLD EDWD -B

## 2019-01-01 PROCEDURE — 74011000258 HC RX REV CODE- 258: Performed by: INTERNAL MEDICINE

## 2019-01-01 PROCEDURE — 36430 TRANSFUSION BLD/BLD COMPNT: CPT

## 2019-01-01 PROCEDURE — 77030021907 HC KT URIN FOL O&M -A

## 2019-01-01 PROCEDURE — 77030020257 HC SOL INJ SOD CL 0.45% 1000ML BG

## 2019-01-01 PROCEDURE — 82728 ASSAY OF FERRITIN: CPT

## 2019-01-01 PROCEDURE — 36600 WITHDRAWAL OF ARTERIAL BLOOD: CPT

## 2019-01-01 PROCEDURE — 87070 CULTURE OTHR SPECIMN AEROBIC: CPT

## 2019-01-01 PROCEDURE — 74018 RADEX ABDOMEN 1 VIEW: CPT

## 2019-01-01 PROCEDURE — 36556 INSERT NON-TUNNEL CV CATH: CPT | Performed by: INTERNAL MEDICINE

## 2019-01-01 PROCEDURE — 84436 ASSAY OF TOTAL THYROXINE: CPT

## 2019-01-01 PROCEDURE — 74011000258 HC RX REV CODE- 258: Performed by: NURSE PRACTITIONER

## 2019-01-01 PROCEDURE — 84295 ASSAY OF SERUM SODIUM: CPT

## 2019-01-01 PROCEDURE — 36592 COLLECT BLOOD FROM PICC: CPT

## 2019-01-01 PROCEDURE — 84145 PROCALCITONIN (PCT): CPT

## 2019-01-01 PROCEDURE — 74011250637 HC RX REV CODE- 250/637: Performed by: NURSE PRACTITIONER

## 2019-01-01 PROCEDURE — 94640 AIRWAY INHALATION TREATMENT: CPT

## 2019-01-01 PROCEDURE — 94644 CONT INHLJ TX 1ST HOUR: CPT

## 2019-01-01 PROCEDURE — 87106 FUNGI IDENTIFICATION YEAST: CPT

## 2019-01-01 PROCEDURE — 94002 VENT MGMT INPAT INIT DAY: CPT

## 2019-01-01 PROCEDURE — 74011250636 HC RX REV CODE- 250/636

## 2019-01-01 PROCEDURE — 85025 COMPLETE CBC W/AUTO DIFF WBC: CPT

## 2019-01-01 PROCEDURE — 85018 HEMOGLOBIN: CPT

## 2019-01-01 PROCEDURE — 77030020263 HC SOL INJ SOD CL0.9% LFCR 1000ML

## 2019-01-01 PROCEDURE — 94645 CONT INHLJ TX EACH ADDL HOUR: CPT

## 2019-01-01 PROCEDURE — 77030006998

## 2019-01-01 PROCEDURE — 83605 ASSAY OF LACTIC ACID: CPT

## 2019-01-01 PROCEDURE — 99291 CRITICAL CARE FIRST HOUR: CPT | Performed by: INTERNAL MEDICINE

## 2019-01-01 PROCEDURE — 94003 VENT MGMT INPAT SUBQ DAY: CPT

## 2019-01-01 PROCEDURE — 87449 NOS EACH ORGANISM AG IA: CPT

## 2019-01-01 PROCEDURE — 87086 URINE CULTURE/COLONY COUNT: CPT

## 2019-01-01 PROCEDURE — 74011250637 HC RX REV CODE- 250/637: Performed by: INTERNAL MEDICINE

## 2019-01-01 PROCEDURE — 87040 BLOOD CULTURE FOR BACTERIA: CPT

## 2019-01-01 PROCEDURE — 83721 ASSAY OF BLOOD LIPOPROTEIN: CPT

## 2019-01-01 PROCEDURE — P9016 RBC LEUKOCYTES REDUCED: HCPCS

## 2019-01-01 PROCEDURE — 84478 ASSAY OF TRIGLYCERIDES: CPT

## 2019-01-01 PROCEDURE — 36556 INSERT NON-TUNNEL CV CATH: CPT

## 2019-01-01 PROCEDURE — 0107U C DIFF TOX AG DETCJ IA STOOL: CPT

## 2019-01-01 PROCEDURE — 84443 ASSAY THYROID STIM HORMONE: CPT

## 2019-01-01 PROCEDURE — 82746 ASSAY OF FOLIC ACID SERUM: CPT

## 2019-01-01 PROCEDURE — 86923 COMPATIBILITY TEST ELECTRIC: CPT

## 2019-01-01 PROCEDURE — 02H633Z INSERTION OF INFUSION DEVICE INTO RIGHT ATRIUM, PERCUTANEOUS APPROACH: ICD-10-PCS | Performed by: INTERNAL MEDICINE

## 2019-01-01 PROCEDURE — 87327 CRYPTOCOCCUS NEOFORM AG IA: CPT

## 2019-01-01 PROCEDURE — 93005 ELECTROCARDIOGRAM TRACING: CPT | Performed by: INTERNAL MEDICINE

## 2019-01-01 PROCEDURE — 77030020256 HC SOL INJ NACL 0.9%  500ML

## 2019-01-01 PROCEDURE — 77030031476 HC EXCH HEAT MOISTW FLTR HALY -A

## 2019-01-01 PROCEDURE — 77030020246 HC SOL INJ D 10% LFCR 1000ML BG

## 2019-01-01 PROCEDURE — 77030020250 HC SOL INJ D 5% LFCR 1000ML BG LF

## 2019-01-01 PROCEDURE — 82607 VITAMIN B-12: CPT

## 2019-01-01 PROCEDURE — 83036 HEMOGLOBIN GLYCOSYLATED A1C: CPT

## 2019-01-01 PROCEDURE — 85610 PROTHROMBIN TIME: CPT

## 2019-01-01 PROCEDURE — 86140 C-REACTIVE PROTEIN: CPT

## 2019-01-01 PROCEDURE — 99292 CRITICAL CARE ADDL 30 MIN: CPT | Performed by: INTERNAL MEDICINE

## 2019-01-01 PROCEDURE — C1751 CATH, INF, PER/CENT/MIDLINE: HCPCS

## 2019-01-01 PROCEDURE — 99223 1ST HOSP IP/OBS HIGH 75: CPT | Performed by: INTERNAL MEDICINE

## 2019-01-01 RX ORDER — MICONAZOLE NITRATE 2 %
POWDER (GRAM) TOPICAL 2 TIMES DAILY
COMMUNITY

## 2019-01-01 RX ORDER — PROPOFOL 10 MG/ML
0-50 VIAL (ML) INTRAVENOUS
Status: DISCONTINUED | OUTPATIENT
Start: 2019-01-01 | End: 2019-01-01

## 2019-01-01 RX ORDER — DILTIAZEM HYDROCHLORIDE 5 MG/ML
5 INJECTION INTRAVENOUS ONCE
Status: COMPLETED | OUTPATIENT
Start: 2019-01-01 | End: 2019-01-01

## 2019-01-01 RX ORDER — SODIUM CHLORIDE 450 MG/100ML
50 INJECTION, SOLUTION INTRAVENOUS CONTINUOUS
Status: DISCONTINUED | OUTPATIENT
Start: 2019-01-01 | End: 2019-01-01

## 2019-01-01 RX ORDER — SODIUM CHLORIDE 0.9 % (FLUSH) 0.9 %
5-40 SYRINGE (ML) INJECTION AS NEEDED
Status: DISCONTINUED | OUTPATIENT
Start: 2019-01-01 | End: 2019-10-20 | Stop reason: HOSPADM

## 2019-01-01 RX ORDER — HYDROCODONE BITARTRATE AND ACETAMINOPHEN 5; 325 MG/1; MG/1
1 TABLET ORAL
COMMUNITY

## 2019-01-01 RX ORDER — FENTANYL CITRATE 100 UG/1
25 TABLET BUCCAL; SUBLINGUAL AS NEEDED
Status: ON HOLD | COMMUNITY
End: 2019-01-01

## 2019-01-01 RX ORDER — AMANTADINE HYDROCHLORIDE 100 MG/1
100 CAPSULE, GELATIN COATED ORAL 2 TIMES DAILY
Status: DISCONTINUED | OUTPATIENT
Start: 2019-01-01 | End: 2019-10-20 | Stop reason: HOSPADM

## 2019-01-01 RX ORDER — ATRACURIUM BESYLATE 10 MG/ML
0.4 INJECTION, SOLUTION INTRAVENOUS ONCE
Status: COMPLETED | OUTPATIENT
Start: 2019-01-01 | End: 2019-01-01

## 2019-01-01 RX ORDER — HYDROXYCHLOROQUINE SULFATE 200 MG/1
200 TABLET, FILM COATED ORAL DAILY
COMMUNITY

## 2019-01-01 RX ORDER — DILTIAZEM HYDROCHLORIDE 5 MG/ML
INJECTION INTRAVENOUS
Status: COMPLETED
Start: 2019-01-01 | End: 2019-01-01

## 2019-01-01 RX ORDER — SODIUM CHLORIDE 9 MG/ML
8 INJECTION, SOLUTION INTRAVENOUS
Status: DISCONTINUED | OUTPATIENT
Start: 2019-01-01 | End: 2019-10-20 | Stop reason: HOSPADM

## 2019-01-01 RX ORDER — CHLORHEXIDINE GLUCONATE 1.2 MG/ML
15 RINSE ORAL EVERY 12 HOURS
COMMUNITY

## 2019-01-01 RX ORDER — FAMOTIDINE 20 MG/1
20 TABLET, FILM COATED ORAL 2 TIMES DAILY
COMMUNITY

## 2019-01-01 RX ORDER — CARBOXYMETHYLCELLULOSE SODIUM 10 MG/ML
1 GEL OPHTHALMIC EVERY 4 HOURS
Status: DISCONTINUED | OUTPATIENT
Start: 2019-01-01 | End: 2019-10-20 | Stop reason: HOSPADM

## 2019-01-01 RX ORDER — CHLORHEXIDINE GLUCONATE 1.2 MG/ML
15 RINSE ORAL EVERY 12 HOURS
Status: ON HOLD | COMMUNITY
End: 2019-01-01

## 2019-01-01 RX ORDER — SODIUM BICARBONATE 84 MG/ML
50 INJECTION, SOLUTION INTRAVENOUS ONCE
Status: COMPLETED | OUTPATIENT
Start: 2019-01-01 | End: 2019-01-01

## 2019-01-01 RX ORDER — DEXTROSE 50 % IN WATER (D50W) INTRAVENOUS SYRINGE
25-50 AS NEEDED
Status: DISCONTINUED | OUTPATIENT
Start: 2019-01-01 | End: 2019-10-20 | Stop reason: HOSPADM

## 2019-01-01 RX ORDER — INSULIN LISPRO 100 [IU]/ML
INJECTION, SOLUTION INTRAVENOUS; SUBCUTANEOUS EVERY 6 HOURS
Status: DISCONTINUED | OUTPATIENT
Start: 2019-01-01 | End: 2019-01-01

## 2019-01-01 RX ORDER — FENTANYL CITRATE 50 UG/ML
50 INJECTION, SOLUTION INTRAMUSCULAR; INTRAVENOUS ONCE
Status: COMPLETED | OUTPATIENT
Start: 2019-01-01 | End: 2019-01-01

## 2019-01-01 RX ORDER — DEXTROSE MONOHYDRATE 50 MG/ML
50 INJECTION, SOLUTION INTRAVENOUS CONTINUOUS
Status: DISPENSED | OUTPATIENT
Start: 2019-01-01 | End: 2019-01-01

## 2019-01-01 RX ORDER — INSULIN GLARGINE 100 [IU]/ML
5 INJECTION, SOLUTION SUBCUTANEOUS DAILY
COMMUNITY

## 2019-01-01 RX ORDER — INSULIN LISPRO 100 [IU]/ML
INJECTION, SOLUTION INTRAVENOUS; SUBCUTANEOUS EVERY 6 HOURS
COMMUNITY

## 2019-01-01 RX ORDER — SODIUM CHLORIDE 9 MG/ML
250 INJECTION, SOLUTION INTRAVENOUS AS NEEDED
Status: DISCONTINUED | OUTPATIENT
Start: 2019-01-01 | End: 2019-01-01

## 2019-01-01 RX ORDER — FUROSEMIDE 10 MG/ML
40 INJECTION INTRAMUSCULAR; INTRAVENOUS EVERY 12 HOURS
Status: DISCONTINUED | OUTPATIENT
Start: 2019-01-01 | End: 2019-01-01

## 2019-01-01 RX ORDER — INSULIN GLARGINE 100 [IU]/ML
25 INJECTION, SOLUTION SUBCUTANEOUS DAILY
Status: DISCONTINUED | OUTPATIENT
Start: 2019-01-01 | End: 2019-01-01

## 2019-01-01 RX ORDER — SODIUM CHLORIDE 0.9 % (FLUSH) 0.9 %
5-40 SYRINGE (ML) INJECTION EVERY 8 HOURS
Status: DISCONTINUED | OUTPATIENT
Start: 2019-01-01 | End: 2019-10-20 | Stop reason: HOSPADM

## 2019-01-01 RX ORDER — HYDROXYCHLOROQUINE SULFATE 200 MG/1
200 TABLET, FILM COATED ORAL DAILY
Status: DISCONTINUED | OUTPATIENT
Start: 2019-01-01 | End: 2019-10-20 | Stop reason: HOSPADM

## 2019-01-01 RX ORDER — DONEPEZIL HYDROCHLORIDE 5 MG/1
5 TABLET, FILM COATED ORAL
COMMUNITY

## 2019-01-01 RX ORDER — HEPARIN SODIUM 5000 [USP'U]/ML
5000 INJECTION, SOLUTION INTRAVENOUS; SUBCUTANEOUS EVERY 8 HOURS
Status: DISCONTINUED | OUTPATIENT
Start: 2019-01-01 | End: 2019-01-01

## 2019-01-01 RX ORDER — FUROSEMIDE 10 MG/ML
40 INJECTION, SOLUTION INTRAMUSCULAR; INTRAVENOUS 2 TIMES DAILY
COMMUNITY

## 2019-01-01 RX ORDER — PAROXETINE HYDROCHLORIDE 20 MG/1
40 TABLET, FILM COATED ORAL DAILY
COMMUNITY

## 2019-01-01 RX ORDER — DEXTROSE MONOHYDRATE 50 MG/ML
100 INJECTION, SOLUTION INTRAVENOUS CONTINUOUS
Status: DISCONTINUED | OUTPATIENT
Start: 2019-01-01 | End: 2019-10-20 | Stop reason: HOSPADM

## 2019-01-01 RX ORDER — ENOXAPARIN SODIUM 100 MG/ML
90 INJECTION SUBCUTANEOUS DAILY
COMMUNITY

## 2019-01-01 RX ORDER — INSULIN GLARGINE 100 [IU]/ML
15 INJECTION, SOLUTION SUBCUTANEOUS DAILY
Status: DISCONTINUED | OUTPATIENT
Start: 2019-01-01 | End: 2019-01-01

## 2019-01-01 RX ORDER — FUROSEMIDE 10 MG/ML
100 INJECTION INTRAMUSCULAR; INTRAVENOUS ONCE
Status: COMPLETED | OUTPATIENT
Start: 2019-01-01 | End: 2019-01-01

## 2019-01-01 RX ORDER — ALBUTEROL SULFATE 2.5 MG/.5ML
2.5 SOLUTION RESPIRATORY (INHALATION) EVERY 4 HOURS
COMMUNITY

## 2019-01-01 RX ORDER — ACETAMINOPHEN 325 MG/1
650 TABLET ORAL
Status: DISCONTINUED | OUTPATIENT
Start: 2019-01-01 | End: 2019-10-20 | Stop reason: HOSPADM

## 2019-01-01 RX ORDER — LORAZEPAM 2 MG/ML
INJECTION INTRAMUSCULAR
Status: COMPLETED
Start: 2019-01-01 | End: 2019-01-01

## 2019-01-01 RX ORDER — AMANTADINE HYDROCHLORIDE 100 MG/1
CAPSULE, GELATIN COATED ORAL 2 TIMES DAILY
Status: ON HOLD | COMMUNITY
End: 2019-01-01

## 2019-01-01 RX ORDER — FENTANYL CITRATE-0.9 % NACL/PF 25 MCG/ML
0-200 PLASTIC BAG, INJECTION (ML) INJECTION
Status: DISCONTINUED | OUTPATIENT
Start: 2019-01-01 | End: 2019-01-01

## 2019-01-01 RX ORDER — DILTIAZEM HYDROCHLORIDE 100 MG/1
100 INJECTION, POWDER, LYOPHILIZED, FOR SOLUTION INTRAVENOUS CONTINUOUS
COMMUNITY

## 2019-01-01 RX ORDER — HYDRALAZINE HYDROCHLORIDE 20 MG/ML
10 INJECTION INTRAMUSCULAR; INTRAVENOUS ONCE
Status: DISCONTINUED | OUTPATIENT
Start: 2019-01-01 | End: 2019-01-01

## 2019-01-01 RX ORDER — ACETAMINOPHEN 325 MG/1
650 TABLET ORAL
Status: ON HOLD | COMMUNITY
End: 2019-01-01

## 2019-01-01 RX ORDER — DEXTROSE 50 % IN WATER (D50W) INTRAVENOUS SYRINGE
25 AS NEEDED
COMMUNITY

## 2019-01-01 RX ORDER — ACETAMINOPHEN 160 MG/5ML
650 LIQUID ORAL
COMMUNITY

## 2019-01-01 RX ORDER — FENTANYL CITRATE-0.9 % NACL/PF 25 MCG/ML
0-200 PLASTIC BAG, INJECTION (ML) INJECTION
Status: DISCONTINUED | OUTPATIENT
Start: 2019-01-01 | End: 2019-10-20 | Stop reason: HOSPADM

## 2019-01-01 RX ORDER — FENTANYL CITRATE 50 UG/ML
INJECTION, SOLUTION INTRAMUSCULAR; INTRAVENOUS
Status: COMPLETED
Start: 2019-01-01 | End: 2019-01-01

## 2019-01-01 RX ORDER — DILTIAZEM HCL/D5W 125 MG/125
15 PLASTIC BAG, INJECTION (ML) INTRAVENOUS
Status: ON HOLD | COMMUNITY
End: 2019-01-01

## 2019-01-01 RX ORDER — DILTIAZEM HYDROCHLORIDE 5 MG/ML
10 INJECTION INTRAVENOUS ONCE
Status: ACTIVE | OUTPATIENT
Start: 2019-01-01 | End: 2019-01-01

## 2019-01-01 RX ORDER — CHLORHEXIDINE GLUCONATE 1.2 MG/ML
15 RINSE ORAL EVERY 12 HOURS
Status: DISCONTINUED | OUTPATIENT
Start: 2019-01-01 | End: 2019-10-20 | Stop reason: HOSPADM

## 2019-01-01 RX ORDER — MICONAZOLE NITRATE 2 %
POWDER (GRAM) TOPICAL AS NEEDED
COMMUNITY

## 2019-01-01 RX ORDER — PROPOFOL 10 MG/ML
0-50 VIAL (ML) INTRAVENOUS
Status: DISCONTINUED | OUTPATIENT
Start: 2019-01-01 | End: 2019-10-20 | Stop reason: HOSPADM

## 2019-01-01 RX ORDER — LORAZEPAM 2 MG/ML
2 INJECTION INTRAMUSCULAR ONCE
Status: COMPLETED | OUTPATIENT
Start: 2019-01-01 | End: 2019-01-01

## 2019-01-01 RX ORDER — PAROXETINE HYDROCHLORIDE 20 MG/1
20 TABLET, FILM COATED ORAL DAILY
Status: DISCONTINUED | OUTPATIENT
Start: 2019-01-01 | End: 2019-10-20 | Stop reason: HOSPADM

## 2019-01-01 RX ORDER — METRONIDAZOLE 500 MG/100ML
500 INJECTION, SOLUTION INTRAVENOUS EVERY 12 HOURS
Status: DISCONTINUED | OUTPATIENT
Start: 2019-01-01 | End: 2019-01-01

## 2019-01-01 RX ORDER — FUROSEMIDE 10 MG/ML
60 INJECTION INTRAMUSCULAR; INTRAVENOUS EVERY 8 HOURS
Status: DISCONTINUED | OUTPATIENT
Start: 2019-01-01 | End: 2019-01-01

## 2019-01-01 RX ORDER — AMANTADINE HYDROCHLORIDE 50 MG/5ML
100 SOLUTION ORAL 2 TIMES DAILY
COMMUNITY

## 2019-01-01 RX ORDER — DEXTROSE 40 %
15 GEL (GRAM) ORAL AS NEEDED
Status: DISCONTINUED | OUTPATIENT
Start: 2019-01-01 | End: 2019-10-20 | Stop reason: HOSPADM

## 2019-01-01 RX ORDER — GLATIRAMER 40 MG/ML
40 INJECTION, SOLUTION SUBCUTANEOUS
Status: DISCONTINUED | OUTPATIENT
Start: 2019-01-01 | End: 2019-10-20 | Stop reason: HOSPADM

## 2019-01-01 RX ORDER — GLATIRAMER 40 MG/ML
40 INJECTION, SOLUTION SUBCUTANEOUS
COMMUNITY

## 2019-01-01 RX ORDER — DILTIAZEM HYDROCHLORIDE 5 MG/ML
10 INJECTION INTRAVENOUS ONCE
Status: COMPLETED | OUTPATIENT
Start: 2019-01-01 | End: 2019-01-01

## 2019-01-01 RX ORDER — ENOXAPARIN SODIUM 100 MG/ML
90 INJECTION SUBCUTANEOUS EVERY 24 HOURS
Status: DISCONTINUED | OUTPATIENT
Start: 2019-01-01 | End: 2019-10-20 | Stop reason: HOSPADM

## 2019-01-01 RX ADMIN — INSULIN GLARGINE 15 UNITS: 100 INJECTION, SOLUTION SUBCUTANEOUS at 09:12

## 2019-01-01 RX ADMIN — FAMOTIDINE 20 MG: 10 INJECTION INTRAVENOUS at 14:30

## 2019-01-01 RX ADMIN — CARBOXYMETHYLCELLULOSE SODIUM 1 DROP: 10 GEL OPHTHALMIC at 00:09

## 2019-01-01 RX ADMIN — SODIUM CHLORIDE 5 MCG/KG/MIN: 900 INJECTION, SOLUTION INTRAVENOUS at 14:27

## 2019-01-01 RX ADMIN — CARBOXYMETHYLCELLULOSE SODIUM 1 DROP: 10 GEL OPHTHALMIC at 19:59

## 2019-01-01 RX ADMIN — Medication 10 ML: at 05:24

## 2019-01-01 RX ADMIN — ATRACURIUM BESYLATE 34.8 MG: 10 INJECTION, SOLUTION INTRAVENOUS at 14:22

## 2019-01-01 RX ADMIN — WATER 20.04 NG/KG/MIN: 1 SOLUTION INTRAVENOUS at 08:00

## 2019-01-01 RX ADMIN — Medication 10 ML: at 13:48

## 2019-01-01 RX ADMIN — SOYBEAN OIL 250 ML: 20 INJECTION, SOLUTION INTRAVENOUS at 19:20

## 2019-01-01 RX ADMIN — PROPOFOL 5 MCG/KG/MIN: 10 INJECTION, EMULSION INTRAVENOUS at 03:27

## 2019-01-01 RX ADMIN — WATER 20.01 NG/KG/MIN: 1 SOLUTION INTRAVENOUS at 07:38

## 2019-01-01 RX ADMIN — SODIUM CHLORIDE 18 MCG/KG/MIN: 900 INJECTION, SOLUTION INTRAVENOUS at 23:09

## 2019-01-01 RX ADMIN — DILTIAZEM HYDROCHLORIDE 10 MG: 5 INJECTION INTRAVENOUS at 18:19

## 2019-01-01 RX ADMIN — WATER 20.04 NG/KG/MIN: 1 SOLUTION INTRAVENOUS at 07:10

## 2019-01-01 RX ADMIN — Medication 10 ML: at 14:33

## 2019-01-01 RX ADMIN — AMANTADINE HYDROCHLORIDE 100 MG: 100 CAPSULE ORAL at 08:49

## 2019-01-01 RX ADMIN — CEFEPIME HYDROCHLORIDE 2 G: 2 INJECTION, POWDER, FOR SOLUTION INTRAVENOUS at 05:05

## 2019-01-01 RX ADMIN — INSULIN LISPRO 6 UNITS: 100 INJECTION, SOLUTION INTRAVENOUS; SUBCUTANEOUS at 18:36

## 2019-01-01 RX ADMIN — CARBOXYMETHYLCELLULOSE SODIUM 1 DROP: 10 GEL OPHTHALMIC at 03:22

## 2019-01-01 RX ADMIN — WATER 20.04 NG/KG/MIN: 1 SOLUTION INTRAVENOUS at 23:32

## 2019-01-01 RX ADMIN — INSULIN LISPRO 2 UNITS: 100 INJECTION, SOLUTION INTRAVENOUS; SUBCUTANEOUS at 23:31

## 2019-01-01 RX ADMIN — FAMOTIDINE 20 MG: 10 INJECTION INTRAVENOUS at 14:32

## 2019-01-01 RX ADMIN — SODIUM CHLORIDE 10 MG/HR: 900 INJECTION, SOLUTION INTRAVENOUS at 13:55

## 2019-01-01 RX ADMIN — CHLORHEXIDINE GLUCONATE 15 ML: 1.2 RINSE ORAL at 20:00

## 2019-01-01 RX ADMIN — CARBOXYMETHYLCELLULOSE SODIUM 1 DROP: 10 GEL OPHTHALMIC at 12:42

## 2019-01-01 RX ADMIN — CARBOXYMETHYLCELLULOSE SODIUM 1 DROP: 10 GEL OPHTHALMIC at 12:05

## 2019-01-01 RX ADMIN — CARBOXYMETHYLCELLULOSE SODIUM 1 DROP: 10 GEL OPHTHALMIC at 23:27

## 2019-01-01 RX ADMIN — METRONIDAZOLE 500 MG: 500 INJECTION, SOLUTION INTRAVENOUS at 09:58

## 2019-01-01 RX ADMIN — CHLORHEXIDINE GLUCONATE 15 ML: 1.2 RINSE ORAL at 22:14

## 2019-01-01 RX ADMIN — CARBOXYMETHYLCELLULOSE SODIUM 1 DROP: 10 GEL OPHTHALMIC at 08:50

## 2019-01-01 RX ADMIN — Medication 25 MCG/HR: at 03:27

## 2019-01-01 RX ADMIN — Medication 10 ML: at 05:56

## 2019-01-01 RX ADMIN — DEXTROSE MONOHYDRATE 100 ML/HR: 5 INJECTION, SOLUTION INTRAVENOUS at 07:37

## 2019-01-01 RX ADMIN — ENOXAPARIN SODIUM 90 MG: 100 INJECTION SUBCUTANEOUS at 09:51

## 2019-01-01 RX ADMIN — Medication 10 ML: at 21:00

## 2019-01-01 RX ADMIN — WATER 20.04 NG/KG/MIN: 1 SOLUTION INTRAVENOUS at 23:19

## 2019-01-01 RX ADMIN — METRONIDAZOLE 500 MG: 500 INJECTION, SOLUTION INTRAVENOUS at 09:46

## 2019-01-01 RX ADMIN — CARBOXYMETHYLCELLULOSE SODIUM 1 DROP: 10 GEL OPHTHALMIC at 09:00

## 2019-01-01 RX ADMIN — BUMETANIDE 1 MG/HR: 0.25 INJECTION INTRAMUSCULAR; INTRAVENOUS at 13:28

## 2019-01-01 RX ADMIN — SODIUM CHLORIDE 18.8 UNITS/HR: 900 INJECTION, SOLUTION INTRAVENOUS at 19:14

## 2019-01-01 RX ADMIN — WATER 20.04 NG/KG/MIN: 1 SOLUTION INTRAVENOUS at 15:26

## 2019-01-01 RX ADMIN — HYDROXYCHLOROQUINE SULFATE 200 MG: 200 TABLET, FILM COATED ORAL at 09:41

## 2019-01-01 RX ADMIN — METHYLPREDNISOLONE SODIUM SUCCINATE 40 MG: 40 INJECTION, POWDER, FOR SOLUTION INTRAMUSCULAR; INTRAVENOUS at 05:00

## 2019-01-01 RX ADMIN — SODIUM CHLORIDE 15 MCG/KG/MIN: 900 INJECTION, SOLUTION INTRAVENOUS at 17:30

## 2019-01-01 RX ADMIN — Medication 10 ML: at 05:00

## 2019-01-01 RX ADMIN — FAMOTIDINE: 10 INJECTION INTRAVENOUS at 17:26

## 2019-01-01 RX ADMIN — CEFEPIME HYDROCHLORIDE 2 G: 2 INJECTION, POWDER, FOR SOLUTION INTRAVENOUS at 17:53

## 2019-01-01 RX ADMIN — Medication 25 MCG/HR: at 14:00

## 2019-01-01 RX ADMIN — CHLORHEXIDINE GLUCONATE 15 ML: 1.2 RINSE ORAL at 21:10

## 2019-01-01 RX ADMIN — INSULIN LISPRO 4 UNITS: 100 INJECTION, SOLUTION INTRAVENOUS; SUBCUTANEOUS at 05:01

## 2019-01-01 RX ADMIN — SODIUM CHLORIDE 5.9 ML/HR: 900 INJECTION, SOLUTION INTRAVENOUS at 23:08

## 2019-01-01 RX ADMIN — METHYLPREDNISOLONE SODIUM SUCCINATE 40 MG: 40 INJECTION, POWDER, FOR SOLUTION INTRAMUSCULAR; INTRAVENOUS at 14:30

## 2019-01-01 RX ADMIN — FAMOTIDINE: 10 INJECTION INTRAVENOUS at 19:06

## 2019-01-01 RX ADMIN — METHYLPREDNISOLONE SODIUM SUCCINATE 40 MG: 40 INJECTION, POWDER, FOR SOLUTION INTRAMUSCULAR; INTRAVENOUS at 05:05

## 2019-01-01 RX ADMIN — METHYLPREDNISOLONE SODIUM SUCCINATE 40 MG: 40 INJECTION, POWDER, FOR SOLUTION INTRAMUSCULAR; INTRAVENOUS at 21:10

## 2019-01-01 RX ADMIN — METHYLPREDNISOLONE SODIUM SUCCINATE 40 MG: 40 INJECTION, POWDER, FOR SOLUTION INTRAMUSCULAR; INTRAVENOUS at 14:42

## 2019-01-01 RX ADMIN — SODIUM CHLORIDE 50 ML/HR: 450 INJECTION, SOLUTION INTRAVENOUS at 10:08

## 2019-01-01 RX ADMIN — CARBOXYMETHYLCELLULOSE SODIUM 1 DROP: 10 GEL OPHTHALMIC at 04:08

## 2019-01-01 RX ADMIN — CARBOXYMETHYLCELLULOSE SODIUM 1 DROP: 10 GEL OPHTHALMIC at 23:11

## 2019-01-01 RX ADMIN — PAROXETINE HYDROCHLORIDE 20 MG: 20 TABLET, FILM COATED ORAL at 09:41

## 2019-01-01 RX ADMIN — FAMOTIDINE: 10 INJECTION INTRAVENOUS at 18:21

## 2019-01-01 RX ADMIN — CEFEPIME HYDROCHLORIDE 2 G: 2 INJECTION, POWDER, FOR SOLUTION INTRAVENOUS at 05:22

## 2019-01-01 RX ADMIN — PAROXETINE HYDROCHLORIDE 20 MG: 20 TABLET, FILM COATED ORAL at 09:43

## 2019-01-01 RX ADMIN — INSULIN LISPRO 2 UNITS: 100 INJECTION, SOLUTION INTRAVENOUS; SUBCUTANEOUS at 18:30

## 2019-01-01 RX ADMIN — CARBOXYMETHYLCELLULOSE SODIUM 1 DROP: 10 GEL OPHTHALMIC at 15:51

## 2019-01-01 RX ADMIN — Medication 10 ML: at 06:28

## 2019-01-01 RX ADMIN — SODIUM CHLORIDE 15 MCG/KG/MIN: 900 INJECTION, SOLUTION INTRAVENOUS at 13:30

## 2019-01-01 RX ADMIN — WATER 20.04 NG/KG/MIN: 1 SOLUTION INTRAVENOUS at 15:45

## 2019-01-01 RX ADMIN — CEFEPIME HYDROCHLORIDE 2 G: 2 INJECTION, POWDER, FOR SOLUTION INTRAVENOUS at 17:36

## 2019-01-01 RX ADMIN — PROPOFOL 4.99 MCG/KG/MIN: 10 INJECTION, EMULSION INTRAVENOUS at 16:23

## 2019-01-01 RX ADMIN — CEFEPIME HYDROCHLORIDE 2 G: 2 INJECTION, POWDER, FOR SOLUTION INTRAVENOUS at 17:37

## 2019-01-01 RX ADMIN — CARBOXYMETHYLCELLULOSE SODIUM 1 DROP: 10 GEL OPHTHALMIC at 23:04

## 2019-01-01 RX ADMIN — Medication 10 ML: at 13:32

## 2019-01-01 RX ADMIN — METRONIDAZOLE 500 MG: 500 INJECTION, SOLUTION INTRAVENOUS at 20:04

## 2019-01-01 RX ADMIN — FAMOTIDINE 20 MG: 10 INJECTION INTRAVENOUS at 14:42

## 2019-01-01 RX ADMIN — CHLORHEXIDINE GLUCONATE 15 ML: 1.2 RINSE ORAL at 20:15

## 2019-01-01 RX ADMIN — SODIUM CHLORIDE 11 MCG/KG/MIN: 900 INJECTION, SOLUTION INTRAVENOUS at 13:49

## 2019-01-01 RX ADMIN — INSULIN GLARGINE 15 UNITS: 100 INJECTION, SOLUTION SUBCUTANEOUS at 09:34

## 2019-01-01 RX ADMIN — PROPOFOL 5 MCG/KG/MIN: 10 INJECTION, EMULSION INTRAVENOUS at 22:31

## 2019-01-01 RX ADMIN — PROPOFOL 5 MCG/KG/MIN: 10 INJECTION, EMULSION INTRAVENOUS at 05:04

## 2019-01-01 RX ADMIN — INSULIN LISPRO 2 UNITS: 100 INJECTION, SOLUTION INTRAVENOUS; SUBCUTANEOUS at 12:29

## 2019-01-01 RX ADMIN — AMANTADINE HYDROCHLORIDE 100 MG: 100 CAPSULE ORAL at 08:54

## 2019-01-01 RX ADMIN — FUROSEMIDE 100 MG: 10 INJECTION, SOLUTION INTRAMUSCULAR; INTRAVENOUS at 10:36

## 2019-01-01 RX ADMIN — DILTIAZEM HYDROCHLORIDE 5 MG: 5 INJECTION INTRAVENOUS at 16:30

## 2019-01-01 RX ADMIN — ENOXAPARIN SODIUM 90 MG: 100 INJECTION SUBCUTANEOUS at 09:37

## 2019-01-01 RX ADMIN — SODIUM CHLORIDE 5 MG/HR: 900 INJECTION, SOLUTION INTRAVENOUS at 20:52

## 2019-01-01 RX ADMIN — CARBOXYMETHYLCELLULOSE SODIUM 1 DROP: 10 GEL OPHTHALMIC at 20:15

## 2019-01-01 RX ADMIN — Medication 10 ML: at 21:20

## 2019-01-01 RX ADMIN — CARBOXYMETHYLCELLULOSE SODIUM 1 DROP: 10 GEL OPHTHALMIC at 16:38

## 2019-01-01 RX ADMIN — INSULIN LISPRO 4 UNITS: 100 INJECTION, SOLUTION INTRAVENOUS; SUBCUTANEOUS at 23:11

## 2019-01-01 RX ADMIN — METHYLPREDNISOLONE SODIUM SUCCINATE 40 MG: 40 INJECTION, POWDER, FOR SOLUTION INTRAMUSCULAR; INTRAVENOUS at 13:41

## 2019-01-01 RX ADMIN — LORAZEPAM 2 MG: 2 INJECTION INTRAMUSCULAR; INTRAVENOUS at 16:22

## 2019-01-01 RX ADMIN — METHYLPREDNISOLONE SODIUM SUCCINATE 40 MG: 40 INJECTION, POWDER, FOR SOLUTION INTRAMUSCULAR; INTRAVENOUS at 22:29

## 2019-01-01 RX ADMIN — PROPOFOL 10 MCG/KG/MIN: 10 INJECTION, EMULSION INTRAVENOUS at 13:48

## 2019-01-01 RX ADMIN — METHYLPREDNISOLONE SODIUM SUCCINATE 40 MG: 40 INJECTION, POWDER, FOR SOLUTION INTRAMUSCULAR; INTRAVENOUS at 05:55

## 2019-01-01 RX ADMIN — FUROSEMIDE 40 MG: 10 INJECTION, SOLUTION INTRAMUSCULAR; INTRAVENOUS at 09:49

## 2019-01-01 RX ADMIN — HYDROXYCHLOROQUINE SULFATE 200 MG: 200 TABLET, FILM COATED ORAL at 09:55

## 2019-01-01 RX ADMIN — FUROSEMIDE 60 MG: 10 INJECTION, SOLUTION INTRAMUSCULAR; INTRAVENOUS at 21:19

## 2019-01-01 RX ADMIN — WATER 20.04 NG/KG/MIN: 1 SOLUTION INTRAVENOUS at 23:11

## 2019-01-01 RX ADMIN — INSULIN GLARGINE 25 UNITS: 100 INJECTION, SOLUTION SUBCUTANEOUS at 09:43

## 2019-01-01 RX ADMIN — INSULIN LISPRO 2 UNITS: 100 INJECTION, SOLUTION INTRAVENOUS; SUBCUTANEOUS at 18:06

## 2019-01-01 RX ADMIN — INSULIN LISPRO 2 UNITS: 100 INJECTION, SOLUTION INTRAVENOUS; SUBCUTANEOUS at 05:22

## 2019-01-01 RX ADMIN — CARBOXYMETHYLCELLULOSE SODIUM 1 DROP: 10 GEL OPHTHALMIC at 03:37

## 2019-01-01 RX ADMIN — INSULIN LISPRO 8 UNITS: 100 INJECTION, SOLUTION INTRAVENOUS; SUBCUTANEOUS at 05:56

## 2019-01-01 RX ADMIN — CARBOXYMETHYLCELLULOSE SODIUM 1 DROP: 10 GEL OPHTHALMIC at 03:25

## 2019-01-01 RX ADMIN — FUROSEMIDE 40 MG: 10 INJECTION, SOLUTION INTRAMUSCULAR; INTRAVENOUS at 20:00

## 2019-01-01 RX ADMIN — AMANTADINE HYDROCHLORIDE 100 MG: 100 CAPSULE ORAL at 09:57

## 2019-01-01 RX ADMIN — CARBOXYMETHYLCELLULOSE SODIUM 1 DROP: 10 GEL OPHTHALMIC at 16:10

## 2019-01-01 RX ADMIN — METHYLPREDNISOLONE SODIUM SUCCINATE 40 MG: 40 INJECTION, POWDER, FOR SOLUTION INTRAMUSCULAR; INTRAVENOUS at 16:25

## 2019-01-01 RX ADMIN — CARBOXYMETHYLCELLULOSE SODIUM 1 DROP: 10 GEL OPHTHALMIC at 19:45

## 2019-01-01 RX ADMIN — CARBOXYMETHYLCELLULOSE SODIUM 1 DROP: 10 GEL OPHTHALMIC at 12:25

## 2019-01-01 RX ADMIN — PAROXETINE HYDROCHLORIDE 20 MG: 20 TABLET, FILM COATED ORAL at 08:49

## 2019-01-01 RX ADMIN — ENOXAPARIN SODIUM 90 MG: 100 INJECTION SUBCUTANEOUS at 08:49

## 2019-01-01 RX ADMIN — ENOXAPARIN SODIUM 90 MG: 100 INJECTION SUBCUTANEOUS at 09:01

## 2019-01-01 RX ADMIN — METRONIDAZOLE 500 MG: 500 INJECTION, SOLUTION INTRAVENOUS at 21:10

## 2019-01-01 RX ADMIN — Medication 10 ML: at 14:11

## 2019-01-01 RX ADMIN — CARBOXYMETHYLCELLULOSE SODIUM 1 DROP: 10 GEL OPHTHALMIC at 20:30

## 2019-01-01 RX ADMIN — SODIUM BICARBONATE 50 MEQ: 84 INJECTION, SOLUTION INTRAVENOUS at 05:04

## 2019-01-01 RX ADMIN — WATER 20.04 NG/KG/MIN: 1 SOLUTION INTRAVENOUS at 07:21

## 2019-01-01 RX ADMIN — METHYLPREDNISOLONE SODIUM SUCCINATE 40 MG: 40 INJECTION, POWDER, FOR SOLUTION INTRAMUSCULAR; INTRAVENOUS at 22:21

## 2019-01-01 RX ADMIN — PROPOFOL 5 MCG/KG/MIN: 10 INJECTION, EMULSION INTRAVENOUS at 17:52

## 2019-01-01 RX ADMIN — FUROSEMIDE 60 MG: 10 INJECTION, SOLUTION INTRAMUSCULAR; INTRAVENOUS at 05:23

## 2019-01-01 RX ADMIN — METHYLPREDNISOLONE SODIUM SUCCINATE 40 MG: 40 INJECTION, POWDER, FOR SOLUTION INTRAMUSCULAR; INTRAVENOUS at 06:28

## 2019-01-01 RX ADMIN — WATER 20.04 NG/KG/MIN: 1 SOLUTION INTRAVENOUS at 15:19

## 2019-01-01 RX ADMIN — SODIUM CHLORIDE 20 MCG/KG/MIN: 900 INJECTION, SOLUTION INTRAVENOUS at 11:55

## 2019-01-01 RX ADMIN — AMANTADINE HYDROCHLORIDE 100 MG: 100 CAPSULE ORAL at 17:37

## 2019-01-01 RX ADMIN — Medication 10 ML: at 14:43

## 2019-01-01 RX ADMIN — SODIUM CHLORIDE 11.6 UNITS/HR: 900 INJECTION, SOLUTION INTRAVENOUS at 16:35

## 2019-01-01 RX ADMIN — DEXTROSE MONOHYDRATE 100 ML/HR: 5 INJECTION, SOLUTION INTRAVENOUS at 16:42

## 2019-01-01 RX ADMIN — INSULIN LISPRO 2 UNITS: 100 INJECTION, SOLUTION INTRAVENOUS; SUBCUTANEOUS at 12:21

## 2019-01-01 RX ADMIN — PROPOFOL 2.6 MCG/KG/MIN: 10 INJECTION, EMULSION INTRAVENOUS at 13:43

## 2019-01-01 RX ADMIN — CHLORHEXIDINE GLUCONATE 15 ML: 1.2 RINSE ORAL at 21:21

## 2019-01-01 RX ADMIN — METRONIDAZOLE 500 MG: 500 INJECTION, SOLUTION INTRAVENOUS at 21:20

## 2019-01-01 RX ADMIN — METHYLPREDNISOLONE SODIUM SUCCINATE 40 MG: 40 INJECTION, POWDER, FOR SOLUTION INTRAMUSCULAR; INTRAVENOUS at 15:04

## 2019-01-01 RX ADMIN — PROPOFOL 2.5 MCG/KG/MIN: 10 INJECTION, EMULSION INTRAVENOUS at 15:36

## 2019-01-01 RX ADMIN — INSULIN LISPRO 4 UNITS: 100 INJECTION, SOLUTION INTRAVENOUS; SUBCUTANEOUS at 12:50

## 2019-01-01 RX ADMIN — PROPOFOL 2.5 MCG/KG/MIN: 10 INJECTION, EMULSION INTRAVENOUS at 20:06

## 2019-01-01 RX ADMIN — WATER 20.04 NG/KG/MIN: 1 SOLUTION INTRAVENOUS at 15:22

## 2019-01-01 RX ADMIN — Medication 10 ML: at 22:30

## 2019-01-01 RX ADMIN — SODIUM CHLORIDE 9 MCG/KG/MIN: 900 INJECTION, SOLUTION INTRAVENOUS at 00:43

## 2019-01-01 RX ADMIN — FENTANYL CITRATE 50 MCG: 50 INJECTION, SOLUTION INTRAMUSCULAR; INTRAVENOUS at 14:04

## 2019-01-01 RX ADMIN — CHLORHEXIDINE GLUCONATE 15 ML: 1.2 RINSE ORAL at 09:52

## 2019-01-01 RX ADMIN — METHYLPREDNISOLONE SODIUM SUCCINATE 40 MG: 40 INJECTION, POWDER, FOR SOLUTION INTRAMUSCULAR; INTRAVENOUS at 05:23

## 2019-01-01 RX ADMIN — SODIUM BICARBONATE 50 MEQ: 84 INJECTION, SOLUTION INTRAVENOUS at 04:08

## 2019-01-01 RX ADMIN — HYDROXYCHLOROQUINE SULFATE 200 MG: 200 TABLET, FILM COATED ORAL at 09:43

## 2019-01-01 RX ADMIN — DEXTROSE MONOHYDRATE 50 ML/HR: 5 INJECTION, SOLUTION INTRAVENOUS at 17:59

## 2019-01-01 RX ADMIN — AMANTADINE HYDROCHLORIDE 100 MG: 100 CAPSULE ORAL at 17:36

## 2019-01-01 RX ADMIN — DEXTROSE MONOHYDRATE 100 ML/HR: 5 INJECTION, SOLUTION INTRAVENOUS at 13:58

## 2019-01-01 RX ADMIN — CARBOXYMETHYLCELLULOSE SODIUM 1 DROP: 10 GEL OPHTHALMIC at 20:03

## 2019-01-01 RX ADMIN — PROPOFOL 2.5 MCG/KG/MIN: 10 INJECTION, EMULSION INTRAVENOUS at 08:54

## 2019-01-01 RX ADMIN — CHLORHEXIDINE GLUCONATE 15 ML: 1.2 RINSE ORAL at 09:51

## 2019-01-01 RX ADMIN — CHLORHEXIDINE GLUCONATE 15 ML: 1.2 RINSE ORAL at 09:48

## 2019-01-01 RX ADMIN — METHYLPREDNISOLONE SODIUM SUCCINATE 40 MG: 40 INJECTION, POWDER, FOR SOLUTION INTRAMUSCULAR; INTRAVENOUS at 21:20

## 2019-01-01 RX ADMIN — CARBOXYMETHYLCELLULOSE SODIUM 1 DROP: 10 GEL OPHTHALMIC at 12:09

## 2019-01-01 RX ADMIN — Medication 10 ML: at 05:05

## 2019-01-01 RX ADMIN — CHLORHEXIDINE GLUCONATE 15 ML: 1.2 RINSE ORAL at 08:50

## 2019-01-01 RX ADMIN — SODIUM CHLORIDE 2.7 UNITS/HR: 900 INJECTION, SOLUTION INTRAVENOUS at 08:00

## 2019-01-01 RX ADMIN — AMANTADINE HYDROCHLORIDE 100 MG: 100 CAPSULE ORAL at 18:41

## 2019-01-01 RX ADMIN — CARBOXYMETHYLCELLULOSE SODIUM 1 DROP: 10 GEL OPHTHALMIC at 16:45

## 2019-01-01 RX ADMIN — METHYLPREDNISOLONE SODIUM SUCCINATE 40 MG: 40 INJECTION, POWDER, FOR SOLUTION INTRAMUSCULAR; INTRAVENOUS at 21:00

## 2019-01-01 RX ADMIN — CARBOXYMETHYLCELLULOSE SODIUM 1 DROP: 10 GEL OPHTHALMIC at 07:36

## 2019-01-01 RX ADMIN — Medication 150 MCG/HR: at 12:06

## 2019-01-01 RX ADMIN — CARBOXYMETHYLCELLULOSE SODIUM 1 DROP: 10 GEL OPHTHALMIC at 09:50

## 2019-01-01 RX ADMIN — ENOXAPARIN SODIUM 90 MG: 100 INJECTION SUBCUTANEOUS at 09:44

## 2019-01-01 RX ADMIN — METRONIDAZOLE 500 MG: 500 INJECTION, SOLUTION INTRAVENOUS at 08:50

## 2019-01-01 RX ADMIN — LORAZEPAM 2 MG: 2 INJECTION INTRAMUSCULAR at 16:22

## 2019-01-01 RX ADMIN — Medication 75 MCG/HR: at 07:34

## 2019-01-01 RX ADMIN — DEXTROSE MONOHYDRATE 50 ML/HR: 5 INJECTION, SOLUTION INTRAVENOUS at 21:59

## 2019-01-01 RX ADMIN — FAMOTIDINE 20 MG: 10 INJECTION INTRAVENOUS at 15:48

## 2019-01-01 RX ADMIN — PAROXETINE HYDROCHLORIDE 20 MG: 20 TABLET, FILM COATED ORAL at 09:55

## 2019-01-01 RX ADMIN — CARBOXYMETHYLCELLULOSE SODIUM 1 DROP: 10 GEL OPHTHALMIC at 12:41

## 2019-01-01 RX ADMIN — SODIUM CHLORIDE 15 MCG/KG/MIN: 900 INJECTION, SOLUTION INTRAVENOUS at 23:10

## 2019-01-01 RX ADMIN — SODIUM CHLORIDE 10 MCG/KG/MIN: 900 INJECTION, SOLUTION INTRAVENOUS at 00:08

## 2019-01-01 RX ADMIN — WATER 20.04 NG/KG/MIN: 1 SOLUTION INTRAVENOUS at 23:13

## 2019-01-01 RX ADMIN — INSULIN GLARGINE 15 UNITS: 100 INJECTION, SOLUTION SUBCUTANEOUS at 10:04

## 2019-01-01 RX ADMIN — INSULIN LISPRO 8 UNITS: 100 INJECTION, SOLUTION INTRAVENOUS; SUBCUTANEOUS at 23:03

## 2019-01-01 RX ADMIN — CHLORHEXIDINE GLUCONATE 15 ML: 1.2 RINSE ORAL at 09:01

## 2019-01-01 RX ADMIN — CEFEPIME HYDROCHLORIDE 2 G: 2 INJECTION, POWDER, FOR SOLUTION INTRAVENOUS at 04:10

## 2019-01-01 RX ADMIN — SODIUM CHLORIDE 16 MCG/KG/MIN: 900 INJECTION, SOLUTION INTRAVENOUS at 10:25

## 2019-01-01 RX ADMIN — SODIUM CHLORIDE 20 MCG/KG/MIN: 900 INJECTION, SOLUTION INTRAVENOUS at 01:39

## 2019-01-01 RX ADMIN — CARBOXYMETHYLCELLULOSE SODIUM 1 DROP: 10 GEL OPHTHALMIC at 15:11

## 2019-01-01 RX ADMIN — SODIUM CHLORIDE 8 UNITS/HR: 900 INJECTION, SOLUTION INTRAVENOUS at 12:23

## 2019-01-01 RX ADMIN — PAROXETINE HYDROCHLORIDE 20 MG: 20 TABLET, FILM COATED ORAL at 08:54

## 2019-01-01 RX ADMIN — INSULIN LISPRO 2 UNITS: 100 INJECTION, SOLUTION INTRAVENOUS; SUBCUTANEOUS at 23:19

## 2019-01-01 RX ADMIN — WATER 20 NG/KG/MIN: 1 SOLUTION INTRAVENOUS at 23:08

## 2019-01-01 RX ADMIN — WATER 20 NG/KG/MIN: 1 SOLUTION INTRAVENOUS at 15:26

## 2019-01-01 RX ADMIN — METHYLPREDNISOLONE SODIUM SUCCINATE 40 MG: 40 INJECTION, POWDER, FOR SOLUTION INTRAMUSCULAR; INTRAVENOUS at 14:33

## 2019-01-01 RX ADMIN — CARBOXYMETHYLCELLULOSE SODIUM 1 DROP: 10 GEL OPHTHALMIC at 15:30

## 2019-01-01 RX ADMIN — AMANTADINE HYDROCHLORIDE 100 MG: 100 CAPSULE ORAL at 17:31

## 2019-01-01 RX ADMIN — HYDROXYCHLOROQUINE SULFATE 200 MG: 200 TABLET, FILM COATED ORAL at 08:49

## 2019-01-01 RX ADMIN — AMANTADINE HYDROCHLORIDE 100 MG: 100 CAPSULE ORAL at 17:53

## 2019-01-01 RX ADMIN — AMANTADINE HYDROCHLORIDE 100 MG: 100 CAPSULE ORAL at 09:41

## 2019-01-01 RX ADMIN — Medication 10 ML: at 22:21

## 2019-01-01 RX ADMIN — INSULIN LISPRO 2 UNITS: 100 INJECTION, SOLUTION INTRAVENOUS; SUBCUTANEOUS at 05:42

## 2019-01-01 RX ADMIN — SODIUM CHLORIDE 11 MCG/KG/MIN: 900 INJECTION, SOLUTION INTRAVENOUS at 08:08

## 2019-01-01 RX ADMIN — HYDROXYCHLOROQUINE SULFATE 200 MG: 200 TABLET, FILM COATED ORAL at 08:54

## 2019-01-01 RX ADMIN — DEXTROSE MONOHYDRATE 100 ML/HR: 5 INJECTION, SOLUTION INTRAVENOUS at 03:46

## 2019-01-01 RX ADMIN — Medication 10 ML: at 14:35

## 2019-01-01 RX ADMIN — AMANTADINE HYDROCHLORIDE 100 MG: 100 CAPSULE ORAL at 09:43

## 2019-10-14 PROBLEM — F41.9 ANXIETY: Chronic | Status: ACTIVE | Noted: 2019-01-01

## 2019-10-14 PROBLEM — E78.5 HYPERLIPIDEMIA: Chronic | Status: ACTIVE | Noted: 2019-01-01

## 2019-10-14 PROBLEM — R19.7 DIARRHEA: Status: ACTIVE | Noted: 2019-01-01

## 2019-10-14 PROBLEM — M06.9 RHEUMATOID ARTHRITIS (HCC): Chronic | Status: ACTIVE | Noted: 2019-01-01

## 2019-10-14 PROBLEM — N17.9 AKI (ACUTE KIDNEY INJURY) (HCC): Status: ACTIVE | Noted: 2019-01-01

## 2019-10-14 PROBLEM — G47.33 OSA (OBSTRUCTIVE SLEEP APNEA): Chronic | Status: ACTIVE | Noted: 2019-01-01

## 2019-10-14 PROBLEM — M19.90 OSTEOARTHRITIS: Chronic | Status: ACTIVE | Noted: 2019-01-01

## 2019-10-14 PROBLEM — M34.9 SCLERODERMA (HCC): Chronic | Status: ACTIVE | Noted: 2019-01-01

## 2019-10-14 PROBLEM — E87.0 HYPERNATREMIA: Status: ACTIVE | Noted: 2019-01-01

## 2019-10-14 PROBLEM — F32.A DEPRESSION: Chronic | Status: ACTIVE | Noted: 2019-01-01

## 2019-10-14 PROBLEM — E11.9 DIABETES (HCC): Chronic | Status: ACTIVE | Noted: 2019-01-01

## 2019-10-14 PROBLEM — I10 HTN (HYPERTENSION): Chronic | Status: ACTIVE | Noted: 2019-01-01

## 2019-10-14 PROBLEM — I48.91 NEW ONSET ATRIAL FIBRILLATION (HCC): Status: ACTIVE | Noted: 2019-01-01

## 2019-10-14 PROBLEM — Z86.711 HISTORY OF PULMONARY EMBOLISM: Status: ACTIVE | Noted: 2019-01-01

## 2019-10-14 PROBLEM — R50.9 FEVER: Status: ACTIVE | Noted: 2019-01-01

## 2019-10-14 PROBLEM — G35 MULTIPLE SCLEROSIS (HCC): Chronic | Status: ACTIVE | Noted: 2019-01-01

## 2019-10-14 PROBLEM — G62.81 CRITICAL ILLNESS POLYNEUROPATHY (HCC): Status: ACTIVE | Noted: 2019-01-01

## 2019-10-14 PROBLEM — I27.20 PULMONARY HYPERTENSION (HCC): Chronic | Status: ACTIVE | Noted: 2019-01-01

## 2019-10-14 PROBLEM — K21.9 GERD (GASTROESOPHAGEAL REFLUX DISEASE): Chronic | Status: ACTIVE | Noted: 2019-01-01

## 2019-10-14 PROBLEM — Z87.19 HISTORY OF ESOPHAGITIS: Chronic | Status: ACTIVE | Noted: 2019-01-01

## 2019-10-14 PROBLEM — D64.9 ANEMIA: Status: ACTIVE | Noted: 2019-01-01

## 2019-10-14 PROBLEM — Z79.52 LONG TERM (CURRENT) USE OF SYSTEMIC STEROIDS: Chronic | Status: ACTIVE | Noted: 2019-01-01

## 2019-10-14 PROBLEM — J96.01 ACUTE RESPIRATORY FAILURE WITH HYPOXIA (HCC): Status: ACTIVE | Noted: 2019-01-01

## 2019-10-14 NOTE — CONSULTS
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 
· S/p trach/PEG, 10/10/19 · H/o PE - on anticoagulation · Multiple sclerosis · Scleroderma Plan:  
· Unclear the extent to which active infection is contributing to her respiratory compromise, versus ARDS post-infectious inflammatory response. · Given the high gastric residuals and elevated procalcitonin, will treat for aspiration PNA. She has hypoactive bowel sounds and will obtain KUB as well. She is C diff negative from 10/12 so will not repeat now. Will treat with cefepime/flagyl. · Will not continue Eraxis or vancomycin at this time. · Due to immunocompromised status, will order urine Histo, serum Crypto, and Fungitell. · Check ferritin Anti-infectives: · IV vanc · IV cefepime · IV flagyl · IV Eraxis Subjective:  
Date of Consultation:  October 14, 2019 Referring Physician: Dr. Mathew Almaraz Patient is a 69 y.o. female with h/o Scleroderma/RA as well as MS who originally presented to [facility] on 9/23/19 with pneumonia and sepsis. She was intubated and treated with broad spectrum antibiotics and steroids, and workup was notable for positive Rhinovirus. She was ultimately extubated on 10/3 but then was noted to have severe weakness. MRI of C-spine and brain were done which showed some enhancing lesions (possible MS) and she was felt to have CIDP. She was given 5 doses of IVIG as well. On 10/8 her condition worsened again and she was re intubated, then ultimately had trach/PEG on 10/10. BAL from 10/8 has grown Enterococcus. Her condition did not improve and she was transferred to [facility] on 10/12/19.   On admission she has leukocytosis and diarrhea with FMS in place. C diff testing is negative. She has had consistently high FiO2 to the point of needing aggressive ICU interventions to maintain oxygenation. She has been transferred to [facility] ICU. Upon admission at [facility], she was started on IV vancomycin, cefepime, flagyl, and anidulafungin. She is in the ICU, sedated and paralyzed, receiving high amounts of O2 via tracheostomy. ID is consulted for further recommendations. Patient Active Problem List  
Diagnosis Code  Acute respiratory failure with hypoxia ([facility]) J96.01  
 Multiple sclerosis ([facility] G35  
 Critical illness polyneuropathy ([facility]) G62.81  New onset atrial fibrillation ([facility]) I48.91  
 Scleroderma ([facility] M34.9  Fever R50.9  Hypernatremia E87.0  Anemia D64.9  
 History of pulmonary embolism Z86.711  
 Diarrhea R19.7  Diabetes ([facility] E11.9  GERD (gastroesophageal reflux disease) K21.9  Anxiety F41.9  Depression F32.9  Hyperlipidemia E78.5  
 HTN (hypertension) I10  
 SADIE (obstructive sleep apnea) G47.33  
 MAE (acute kidney injury) ([facility] N17.9  Osteoarthritis M19.90  Pulmonary hypertension (HCC) I27.20  
 History of esophagitis Z87.19  Long term (current) use of systemic steroids Z79.52 No past medical history on file. No family history on file. Social History Tobacco Use  Smoking status: Not on file Substance Use Topics  Alcohol use: Not on file Past Surgical History:  
Procedure Laterality Date  HX BACK SURGERY    
 HX CHOLECYSTECTOMY  HX COLONOSCOPY    
 HX OTHER SURGICAL    
 laminectomy - ? level Prior to Admission medications Medication Sig Start Date End Date Taking? Authorizing Provider  
glatiramer (COPAXONE) 40 mg/mL syrg 40 mg by SubCUTAneous route every Monday, Wednesday, Friday. Yes Provider, Historical  
fentaNYL citrate 100 mcg tablet 25 mcg by Buccal route as needed for Pain (infusion).    Yes Provider, Historical  
 enoxaparin (LOVENOX) 100 mg/mL 90 mg by SubCUTAneous route daily. Yes Provider, Historical  
vancomycin irrigation (VANCOCIN) 1 g/500 mL 0.9% sodium chloride soln 1.2 g by Irrigation route once. Yes Provider, Historical  
furosemide (LASIX) 10 mg/mL injection 40 mg by IntraVENous route two (2) times a day. Yes Provider, Historical  
anidulafungin 100 mg 100 mg by IntraVENous route every twenty-four (24) hours. Yes Provider, Historical  
methylPREDNISolone, PF, (SOLU-MEDROL) 40 mg/mL solution 40 mg by IntraVENous route every eight (8) hours. Yes Provider, Historical  
albuterol sulfate (PROVENTIL;VENTOLIN) 2.5 mg/0.5 mL nebu nebulizer solution 2.5 mg by Nebulization route every four (4) hours. Yes Provider, Historical  
hydroxychloroquine (PLAQUENIL) 200 mg tablet 200 mg by Per G Tube route daily. Yes Provider, Historical  
HYDROcodone-acetaminophen (NORCO) 5-325 mg per tablet 1 Tab by Per G Tube route every six (6) hours as needed for Pain. Yes Provider, Historical  
acetaminophen (TYLENOL) 325 mg tablet 650 mg by Per G Tube route every six (6) hours as needed for Pain. Yes Provider, Historical  
metroNIDAZOLE, empty container 1 Each 500 mg by IntraVENous route every eight (8) hours. Yes Provider, Historical  
cefepime (MAXIPIME) 2 gram injection 2 g by IntraMUSCular route every eight (8) hours. Yes Provider, Historical  
PARoxetine (PAXIL) 20 mg tablet 20 mg by Per G Tube route daily. Yes Provider, Historical  
insulin glargine (LANTUS U-100 INSULIN) 100 unit/mL injection 5 Units by SubCUTAneous route daily. Yes Provider, Historical  
famotidine (PEPCID) 20 mg tablet 20 mg by Per G Tube route two (2) times a day. Yes Provider, Historical  
donepezil (ARICEPT) 5 mg tablet 5 mg by Per G Tube route nightly. Yes Provider, Historical  
chlorhexidine (PERIDEX) 0.12 % solution 15 mL by Swish and Spit route every twelve (12) hours.    Yes Provider, Historical  
 amantadine HCl (SYMMETREL) 100 mg capsule by Per G Tube route two (2) times a day. Yes Provider, Historical  
fentaNYL 1500 mcg/50 ml soln 25 mcg/hr by IntraVENous route TITRATE. Yes Provider, Historical  
dilTIAZem (CARDIZEM) 125 mg/125 mL (1 mg/mL) soln infusion 15 mg/hr by IntraVENous route TITRATE. Yes Provider, Historical  
 
 
No Known Allergies Review of Systems:  Review of systems not obtained due to patient factors. Objective:  
 
Visit Vitals /70 Pulse 72 Temp 97.7 °F (36.5 °C) Resp 20 Wt 86.9 kg (191 lb 9.3 oz) SpO2 (!) 88% Temp (24hrs), Av.7 °F (36.5 °C), Min:97.7 °F (36.5 °C), Max:97.7 °F (36.5 °C) Lines:  Central Venous Catheter:    
 
Physical Exam:   
General:  Sedated and on the ventilator. Eyes:  Sclera anicteric. Pupils non-reactive on paralysis Mouth/Throat: Tongue swollen Neck: Supple. Tracheostomy site clean  
Lungs:   Coarse breath sounds, diminished posteriorly; no wheezing or rhonchi noted;  
Cardiovascular:  Regular rate and rhythm, no murmur, click, rub, or gallop. Abdomen:   Soft, non-tender, bowel sounds hypo-active, non-distended. PEG site clean Extremities: No cyanosis; moderate diffuse edema noted Skin: No acute rash or lesions. Lymph Nodes: Cervical and supraclavicular normal.  
Musculoskeletal:  No swelling or deformity. Lines/Devices:  Intact, no erythema, drainage, or tenderness. Psychiatric: Sedated and appears comfortable on ventilator. Data Review: CBC: 
Recent Labs 10/14/19 
0700 10/13/19 
9538 10/12/19 
1557 WBC 16.4* 18.0* 21.5* GRANS 92* 95* 90* MONOS 3* 1* 3*  
EOS 0* 0* 1 ANEU 15.1* 17.1* 19.4* ABL 0.6 0.5 0.8 HGB 8.5* 9.6* 8.3* HCT 28.5* 33.7* 29.4*  
 277 249 BMP: 
Recent Labs 10/14/19 
0700 10/13/19 
1073 10/12/19 
1557 CREA 1.49* 1.47* 1.21* BUN 97* 86* 68* * 149* 146*  
K 4.0 4.9 4.9  
* 118* 119* CO2 26 21 21 AGAP 9 10 6*  
 * 209* 134* LFTS: 
Recent Labs 10/14/19 
0700 10/13/19 
7072 10/12/19 
1557 TBILI 0.4 0.5 0.3 ALT 10* 12 11* SGOT 27 29 35 * 171* 152* TP 7.3 7.4 7.1 ALB 1.7* 1.6* 1.5* Microbiology:  
 
All Micro Results None Imaging:  
10/14/19 CXR: IMPRESSION:  
1. Support tubing in good position. 2.  Stable bilateral pulmonary infiltrates. 10/4/19 C-spine MRI:  
Impression: 1. There appear to be several areas of subtle increased signal on proton density images of the cervical spinal cord as described above. These are more evident than on the prior exam. These suggest subtle MS involvement. None of these lesions appear to significantly enhance. 2. There is minimal degenerative change as described above. 10/4/19 MRI brain:  
Result Impression 1. White matter disease pattern very similar to 8/8/2019. This is consistent with demyelinating disease. They do not enhance. 2. Single focus of diffusion restriction in the basal ganglia region on the right which is similar to the previous study. 3. Other incidental findings as above. Sudhir Sinha Signed By: Elihu Closs, MD   
 October 14, 2019

## 2019-10-14 NOTE — PROGRESS NOTES
TRANSFER - IN REPORT:    Verbal report received from hospitals (name) on Dewey Gonzalez  being received from [place] (unit) for urgent transfer      Report consisted of patients Situation, Background, Assessment and   Recommendations(SBAR). Information from the following report(s) SBAR, Kardex, Intake/Output, MAR, Recent Results, Cardiac Rhythm NSR and Alarm Parameters  was reviewed with the receiving nurse. Opportunity for questions and clarification was provided. Assessment completed upon patients arrival to unit and care assumed.

## 2019-10-14 NOTE — H&P
HISTORY AND PHYSICAL Enrrique Naranjo 10/14/2019 Date of Admission:  10/14/2019 The patient's chart is reviewed and the patient is discussed with the staff. Subjective:  
 
Enrrique Naranjo is a 69 y.o. female being transferred to the ICU from the [facility]). She was transferred there on Saturday but experienced need for increased oxygen support during transport (85% up to 100%). She had been hospitalized at [facility] on Sept 23rd with confusion. She was felt to be septic and had an elevated WBC and lactic acid level. She was hypoxic and her CXR showed bilateral infiltrates. She was treated for pneumonia (? Bacterial versus viral versus aspiration). The viral panel was positive for rhinovirus. Her CXR worsened on 9/26 and she required intubation on 9/27. She required paralysis for ventilation due to respiratory distress. The pulmonary infiltrates progressed and she was felt to have ARDS. She was started on lasix for suspected volume overload. Echo shows an EF 55 to 60 mmHg. She has MAE and is being followed by nephrology. She gradually improved so was extubated on 10/3. She was noted to be confused and profoundly weak so was seen in consultation by neurology. 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 Nerve conduction studies and EMG were completed which revealed primary axonal loss as opposed to demyelinating syndrome more consistent with [facility] as opposed to [facility]. She has received 5 doses of IVIG. She had a brain MRI on Oct 4 which showed multiple, probable, MS lesions in the cerebrum and c spine (stable from previous examinations). She developed acute respiratory failure on 10/8 requiring reintubation. She underwent trach and PEG placement on 10/10/19. She has been tube feeding dependent but has not only had diarrhea but high gastric residuals. The TF is currently on hold. She underwent therapeutic bronchoscopy on 10/8 and 10/11 for mucus plugging. Bronchial washings reported enterococcus. She was restarted on antibiotics at the time of transfer to [facility] and recultured. All cultures from there are negative so far. She remains febrile and has a leukocytosis. Procalcitonin is 14.0. She is requiring high amount of peep and FIO2 for oxygenation. She remains in respiratory distress. She has been sedated with Fentanyl. She developed rapid a fib after transfer so is now on a cardizem infusion and cardiology is following. Her PMH is significant for multiple sclerosis and scleroderma. She is followed by Rheumatogy and treated with Plaquenil and 5 mg of Prednisone per day. She is monitored for pulmonary hypertension and RVSP in the past has measured 40 mmHg but more recent echo showed a RVSP of 32 mmHg. She also has SADIE with home CPAP that was started last year. She was diagnosed with a pulmonary embolism in June of 2019. She was on Eliquis as an outpatient but is currently on Lovenox Review of Systems Review of systems not obtained due to patient factors. Patient Active Problem List  
Diagnosis Code  Acute respiratory failure with hypoxia (HCC) J96.01  
 Multiple sclerosis ([facility] G35  
 Critical illness polyneuropathy (HCC) G62.81  New onset atrial fibrillation ([facility]) I48.91  
 Scleroderma ([facility] M34.9  Fever R50.9  Hypernatremia E87.0  Anemia D64.9  
 History of pulmonary embolism Z86.711  
 Diarrhea R19.7  Diabetes ([address] E11.9  GERD (gastroesophageal reflux disease) K21.9  Anxiety F41.9  Depression F32.9  Hyperlipidemia E78.5  
 HTN (hypertension) I10  
 SADIE (obstructive sleep apnea) G47.33  
 MAE (acute kidney injury) ([facility] N17.9  Osteoarthritis M19.90  Pulmonary hypertension (HCC) I27.20  
 History of esophagitis Z87.19  Long term (current) use of systemic steroids Z79.52 None No past medical history on file. Past Surgical History:  
Procedure Laterality Date  HX BACK SURGERY    
 HX CHOLECYSTECTOMY  HX COLONOSCOPY    
 HX OTHER SURGICAL    
 laminectomy - ? level Social History Socioeconomic History  Marital status: Not on file Spouse name: Not on file  Number of children: Not on file  Years of education: Not on file  Highest education level: Not on file Occupational History  Occupation: disabled nurse Social Needs  Financial resource strain: Not on file  Food insecurity:  
  Worry: Not on file Inability: Not on file  Transportation needs:  
  Medical: Not on file Non-medical: Not on file Tobacco Use  Smoking status: Not on file Substance and Sexual Activity  Alcohol use: Not on file  Drug use: Not on file  Sexual activity: Not on file Lifestyle  Physical activity:  
  Days per week: Not on file Minutes per session: Not on file  Stress: Not on file Relationships  Social connections:  
  Talks on phone: Not on file Gets together: Not on file Attends [employer] witness service: Not on file Active member of club or organization: Not on file Attends meetings of clubs or organizations: Not on file Relationship status: Not on file  Intimate partner violence:  
  Fear of current or ex partner: Not on file Emotionally abused: Not on file Physically abused: Not on file Forced sexual activity: Not on file Other Topics Concern  Not on file Social History Narrative . No family history on file. Cannot obtain due to patient status No Known Allergies Current Facility-Administered Medications Medication Dose Route Frequency  sodium chloride (NS) flush 5-40 mL  5-40 mL IntraVENous Q8H  
 sodium chloride (NS) flush 5-40 mL  5-40 mL IntraVENous PRN  
 heparin (porcine) injection 5,000 Units  5,000 Units SubCUTAneous Q8H  
  atracurium (TRACRIUM) 1,000 mg in 0.9% sodium chloride 250 mL infusion  0-15 mcg/kg/min IntraVENous TITRATE  dilTIAZem (CARDIZEM) 100 mg in 0.9% sodium chloride (MBP/ADV) 100 mL infusion  0-15 mg/hr IntraVENous TITRATE  fentaNYL in normal saline (pf) 25 mcg/mL infusion  0-200 mcg/hr IntraVENous TITRATE  propofol (DIPRIVAN) infusion  0-50 mcg/kg/min IntraVENous TITRATE  carboxymethylcellulose sodium (REFRESH LIQUIGEL) 1 % ophthalmic solution 1 Drop  1 Drop Both Eyes Q4H Objective:  
 
Vitals:  
 10/14/19 1307 10/14/19 1313 10/14/19 1328 10/14/19 1343 BP:  129/64 (!) 178/94 155/70 Pulse: 62 61 65 72 Resp: 21 22 24 20 Temp:      
SpO2: 93% (!) 88% (!) 79% (!) 88% Weight: PHYSICAL EXAM  
 
Constitutional:  the patient is well developed and in moderate distress with paradoxical breathing which is asynchronous. HEENT:  Sclera clear, pupils equal, oral mucosa moist 
Respiratory: Harsh breath sounds - sounds worse on the right than the left. Has cuffed trach in place. Cardiovascular:  Irregular and without M,G,R. A fib per telemetry Abd/GI: soft and ? Non tender; with hypoactiive bowel sounds. She has a PEG that is currently clamped Ext: warm without cyanosis. There is generalized edema. Skin:  no jaundice or rashes, gluteal and heel wounds Neuro: sedated - on fentanyl infusion. Musculoskeletal:  No spontaneous movement Chest Xray:   
 
Echo: Echo: 
 Technical Quality: Technically difficult study IMPRESSIONS Normal global left ventricular size, wall thickness, systolic function with no obvious regional wall motion abnormalities. Normal left ventricular chamber size and systolic function with Left ventricular ejection fraction estimated at 55-60%. Abnormal relaxation filling pattern of the left ventricle for age (stage 1 diastolic dysfunction). Moderate left atrial dilatation. Trace mitral regurgitation. Trace to mild tricuspid regurgitation. FINDINGS Left Ventricle Normal global left ventricular size, wall thickness, systolic function with no obvious regional wall motion 
 abnormalities. Left ventricular ejection fraction is estimated at 55-60 %. Abnormal relaxation filling pattern 
 of the left ventricle for age (stage 1 diastolic dysfunction). Right Ventricle Normal right ventricular size and function. Right Atrium Normal right atrial size. Left Atrium Moderate left atrial dilatation. [place] CHAPINCITO 35ml/m2 Mitral Valve Structurally normal mitral valve. Trace mitral regurgitation. Mitral annular calcification Aortic Valve Aortic valve not well visualized but it appears to be grossly normal.  No aortic stenosis. No aortic 
 regurgitation. Tricuspid Valve Structurally normal tricuspid valve. Trace to mild tricuspid regurgitation. Right ventricular systolic pressure 
 estimated at 32 mmHg. Pulmonic Valve Pulmonic valve not well visualized. Pericardium No pericardial effusion Vessels Normal inferior vena cava. Aortic root and proximal ascending aorta not well visualized. Recent Labs 10/14/19 
0700 10/13/19 
3324 10/12/19 
1557 WBC 16.4* 18.0* 21.5* HGB 8.5* 9.6* 8.3* HCT 28.5* 33.7* 29.4*  
 277 249 Recent Labs 10/14/19 
0700 10/13/19 
1879 10/12/19 
1557 * 149* 146*  
K 4.0 4.9 4.9  
* 118* 119* * 209* 134* CO2 26 21 21 BUN 97* 86* 68* CREA 1.49* 1.47* 1.21* MG  --  2.8* 2.7*  
CA 8.2* 8.6 8.3 ALB 1.7* 1.6* 1.5* SGOT 27 29 35 Assessment:  (Medical Decision Making) Hospital Problems  Never Reviewed Codes Class Noted POA Acute respiratory failure with hypoxia ([facility] ICD-10-CM: J96.01 
ICD-9-CM: 518.81  10/14/2019 Yes Multiple sclerosis ([facility] (Chronic) ICD-10-CM: G35 
ICD-9-CM: 340  10/14/2019 Yes Critical illness polyneuropathy ([facility] ICD-10-CM: M90.19 ICD-9-CM: 357.82  10/14/2019 Yes New onset atrial fibrillation [facility]) ICD-10-CM: I48.91 
ICD-9-CM: 427.31  10/14/2019 Yes Scleroderma (HCC) (Chronic) ICD-10-CM: M34.9 ICD-9-CM: 710.1  10/14/2019 Yes Fever ICD-10-CM: R50.9 ICD-9-CM: 780.60  10/14/2019 Yes Hypernatremia ICD-10-CM: E87.0 ICD-9-CM: 276.0  10/14/2019 Yes Anemia ICD-10-CM: D64.9 ICD-9-CM: 285.9  10/14/2019 Yes History of pulmonary embolism ICD-10-CM: Z86.711 ICD-9-CM: V12.55  10/14/2019 Yes Overview Signed 10/14/2019  1:46 PM by Jaquan Lindsay NP Dx June 2019 Diarrhea ICD-10-CM: R19.7 ICD-9-CM: 787.91  10/14/2019 Yes Diabetes ([facility] (Chronic) ICD-10-CM: E11.9 ICD-9-CM: 250.00  10/14/2019 Yes HTN (hypertension) (Chronic) ICD-10-CM: I10 
ICD-9-CM: 401.9  10/14/2019 Yes SADIE (obstructive sleep apnea) (Chronic) ICD-10-CM: G47.33 
ICD-9-CM: 327.23  10/14/2019 Yes Overview Addendum 10/14/2019  1:47 PM by Jaquan Lindsay NP  
  PSG 2018 AHI 9.5/hour Home autopap min + 9 max, +15 MAE (acute kidney injury) ([facility] ICD-10-CM: N17.9 ICD-9-CM: 584.9  10/14/2019 Yes Pulmonary hypertension (HCC) (Chronic) ICD-10-CM: I27.20 ICD-9-CM: 416.8  10/14/2019 Yes Long term (current) use of systemic steroids (Chronic) ICD-10-CM: Z79.52 
ICD-9-CM: V58.65  10/14/2019 Yes Plan:  (Medical Decision Making) 1. Patient has been transferred to the ICU for paralyzation. Full vent support using PRVC. Monitor ABGs, peak pressures, oxygen needs. Continue higher level of peep. Consider Flolan if needed for oyxgenation 2. ID to see for fevers, leukocytosis. Recent bronch washings with Enterococcus. PCT 14. All cultures here negative so far 3. Nephrology following for MAE. Continue lasix and monitor. . Normal EF/RVSP 32 mmHg per recent echo. 4. Cardiology following for a fib - cardizem infusion at present. Decrease rate for now - she is back in sinus at present.  Anticoagulation with lovenox. 5. Lovenox for recent PE and a fib - dosed daily with creatinine clearance 6. Stool for c diff negative. Diarrhea continues - TF on hold for now. Monitor diarrhea and residuals. Consider Questran if needed. 7. Will stop bicarb infusion - acidosis resolved. Monitor Na off D5W 
8. Continue steroids - dependent as outpatient (5 mg at home) 9. Cat 1 status. Her sister is her POA (I understand she is a nurse). Her son lives in [place] and has also been involved - he is a respiratory therapist.  
10. SSI with Lantus for hyperglycemia 11. Serial labs and CXRs for now 12. Change Pepcid to Protonix with history of esophagitis and ulcerations in August 13. Hold Aricept for now - interaction with Propofol Cloteal Sand, NP 
 
_______________________________________________________________________ After independently examining and assessing the patient, I agree with the documented history, exam, assessment and plans outlined in the above note. Vitals:  
 10/14/19 1456 10/14/19 1458 10/14/19 1512 10/14/19 1513 BP:  153/68  158/67 Pulse:  65 66 66 Resp:  25 24 24 Temp:      
SpO2:  94% 92% 93% Weight:      
Height: 5' 2.99\" (1.6 m) Gen: unresponsive CV:irregular, tachycardic Pulm:coarse bilaterally Abd:+BS Ext: +edema Urine culture:  >100K Candida Assessment:  Kristopher Bedolla is a 69 y.o. F who has PMH of limited systemic sclerosis (+SASHA, anticentromere Ab, Raynaud's, sicca symptoms previously on plaquenil), GERD, possible polyneuropathy, history of multiple sclerosis on copaxone, osteoporosis,   prior PE in June, recent ARDS and tracheostomy on 10/10 who presents from [facility] with fevers, worsening hypoxia, MAE, rapid atrial fibrillation requiring diltiazem. We plan to use tracrium to assist with vent synchrony and plan to target low tidal volumes for ARDS. Given high oxygen requirements, may require additional maneuvers like proning or inhaled Flolan. Plan: 1. Tracrium bolus and drip now. Sedation with fentanyl/propofol has been arranged. 2.  If respiratory status stabilizes, consider bronch with BAL for cultures 3. ID consultation for fever workup and antibiotic management. 4.  Check CVP, goal 8-12 roughly 5. ABG now, CXR tomorrow 6. On full dose lovenox with recent PE 4 mos ago. Monitor for bleeding. 7.  Repeat BMP this afternoon to reassess Na and determine if free water repletion required. 8.  Nephrology following. 9.  Called patient's family member [employer] several times and busy signal received. 10. Full code Lani Rivera MD 
 
More than 50% of time documented was spent face-to-face contact with the patient and in the care of the patient on the floor/unit where the patient is located

## 2019-10-14 NOTE — PROGRESS NOTES
afib RVR on monitor. Increased cardizem gtt per order. Discussed patient condition and current vitals with Dr. Carmel Agosto. Orders received, see MAR.

## 2019-10-14 NOTE — PROGRESS NOTES
Patients peak pressures are reaching into the high 50s. ABG showed a respiratory acidosis with CO2 of 60 which was expected. PO2 was 84. To bring down peak pressures Peep was dropped from 15 to 12 and PC of 33 is being used to  keep tidal volumes around 300 which is in line with 6 ml per Kg of Ideal body weight. Settings were reviewed with Dr. Gael Merchant to ensure safety and continued effort to improve patients care.

## 2019-10-14 NOTE — PROCEDURES
The pt. Was placed in the supine position The R neck was prepped and drapped 1 % lidocaine was injected locally The R IJ was imaged with ultrasound and was accessed with ultrasound guidance using 18 gauge needle A quad lumen catheter was then placed using Seldinger technique- good blood return. cxr shows good placement

## 2019-10-14 NOTE — PROGRESS NOTES
Micaela Butterfiedl arrived to unit via stretcher, escorted by RNx2 and RT. Cardiac and respiratory monitors placed. Patient currently on 100% fiO2. Dual skin assessment performed with Tanvi Schwab RN. Skin tear to sacrum. Dark, flaky, dry area noted to L heel. Allevyn placed  for further breakdown prevention.

## 2019-10-14 NOTE — PROGRESS NOTES
Initial visit made to patient and a prayer was provided. A  card was left. The patient was not alert. MABEL Hartmann

## 2019-10-14 NOTE — H&P
Date of Surgery Update: 
Nury Munguia was seen and examined. History and physical has been reviewed. The patient has been examined.  There have been no significant clinical changes since the completion of the originally dated History and Physical. 
 
Signed By: Wesly Valenzuela MD   
 October 14, 2019 3:09 PM

## 2019-10-15 NOTE — PROGRESS NOTES
ABG results reported to MD; orders given to place patient on [facility], [place] with documented settings

## 2019-10-15 NOTE — PROGRESS NOTES
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 
· S/p trach/PEG, 10/10/19 · H/o PE - on anticoagulation · Multiple sclerosis · Scleroderma Plan: · Will continue cefepime/flagyl for now, likely stop abx tomorrow if stable - she will have gotten an adequate course of treatment for aspiration PNA. Procalcitonin went from 14 to 0.3. · ID will continue to follow. Anti-infectives: · IV cefepime · IV flagyl Subjective:  
Date of Consultation:  October 15, 2019 Referring Physician: Dr. Cid Duty Afebrile overnight; having worsening hypoxia, rapid afib, and pulsatile facial twitching; Allergies Allergen Reactions  Aspirin Unable to Obtain  Codeine Unable to Obtain  Propoxyphene Unable to Obtain Review of Systems:  Review of systems not obtained due to patient factors. Objective:  
 
Visit Vitals /56 Pulse (!) 53 Temp 97 °F (36.1 °C) Resp 26 Ht 5' 2.99\" (1.6 m) Wt 88 kg (194 lb 0.1 oz) SpO2 100% BMI 34.38 kg/m² Temp (24hrs), Av.2 °F (36.8 °C), Min:97 °F (36.1 °C), Max:99.2 °F (37.3 °C) Lines:  Central Venous Catheter:    
 
Physical Exam:   
General:  Sedated and on the ventilator. Facial swelling noted;  
Eyes:  Sclera anicteric Mouth/Throat: Tongue swollen; pulsatile facial twitching noted;  
Neck: Supple. Tracheostomy site clean  
Lungs:   Coarse breath sounds, diminished posteriorly; no wheezing or rhonchi noted;  
Cardiovascular:  Tachy, irregular rhythm, no murmur, click, rub, or gallop. Abdomen:   Soft, non-tender, bowel sounds hypo-active, non-distended. PEG site clean Extremities: No cyanosis; moderate diffuse edema noted Skin: No acute rash or lesions. Lymph Nodes: Musculoskeletal:  No swelling or deformity. Lines/Devices:  Intact, no erythema, drainage, or tenderness. Psychiatric: Sedated and appears in distress Data Review: CBC: 
Recent Labs 10/15/19 
0311 10/14/19 
0700 10/13/19 
9875 WBC 12.1* 16.4* 18.0* GRANS 90* 92* 95* MONOS 5 3* 1*  
EOS 0* 0* 0* ANEU 10.9* 15.1* 17.1* ABL 0.4* 0.6 0.5 HGB 7.7* 8.5* 9.6* HCT 26.6* 28.5* 33.7*  
 326 277 BMP: 
Recent Labs 10/15/19 
0311 10/14/19 
1759 10/14/19 
0700 CREA 1.76* 1.59* 1.49* * 104* 97* * 150* 150*  
K 3.5 3.9 4.0  
* 114* 115* CO2 27 31 26 AGAP 10 5* 9 * 315* 296* LFTS: 
Recent Labs 10/15/19 
0311 10/14/19 
0700 10/13/19 
3053 TBILI 0.5 0.4 0.5 ALT 8* 10* 12 SGOT 9* 27 29 * 197* 171* TP 6.3 7.3 7.4 ALB 1.6* 1.7* 1.6* Microbiology:  
 
All Micro Results Procedure Component Value Units Date/Time CRYPTOCOCCUS AG W/REFLEX TITER [[id number]] Collected:  10/14/19 1759 Order Status:  Completed Specimen:  Serum from Blood Updated:  10/14/19 1807 Imaging:  
10/15/19 CXR: FINDINGS: Diffuse bilateral lung edema or infiltrates are unchanged. The cardiac 
size is stable. No pneumothorax or pleural effusion is seen. The tracheostomy 
tube and right jugular central line remain in place, and appear unchanged in 
position. 10/14/19 KUB: Supine views of the abdomen were obtained. Bowel pattern is normal.  There is 
no evidence of obstruction. There are surgical changes in the lumbar spine. There are infiltrates in the lung bases. Signed By: Ulisses Villarreal MD   
 October 15, 2019

## 2019-10-15 NOTE — PROGRESS NOTES
Ventilator check complete; patient has a #8.0 tracheostomy. Patient is sedated and paralyzed. Patient is not able to follow commands. Breath sounds are coarse. Trachea is midline, Negative for subcutaneous air, and chest excursion is asymmetric  - greater on the right. Patient is also Negative for cyanosis and is Negative for pitting edema. All alarms are set and audible. Resuscitation bag is at the head of the bed. Ventilator Settings Mode FIO2 Rate Tidal Volume Pressure PEEP I:E Ratio PRVC  80 % 26 350 ml     10 cm H20  1:2.5 Peak airway pressure: 48 cm H2O Minute ventilation: 9.6 l/min ABG: Results for Yanely Oas (MRN [id number]) as of 10/15/2019 08:55 
 10/15/2019 03:33  
pH (POC) 7.323 (L)  
pCO2 (POC) 48.6 (H) pO2 (POC) 119 (H) HCO3 (POC) 25.2  
sO2 (POC) 98 Base deficit (POC) 1 FIO2 (POC) 90 Patient temp. 98.6 Specimen type (POC) ARTERIAL Set Rate 26 Site RIGHT RADIAL Device: VENT Mode Pressure regulated volume control Tidal volume 350 PEEP/CPAP (POC) 10 Allens test (POC) NOT APPLICABLE Respiratory comment: NurseNotified Inspiratory Time 0.7 Natanael Genera

## 2019-10-15 NOTE — PROGRESS NOTES
Patient placed back in supine position with RT assistance, per Dr. Lorena Rodriguez, because of high peak pressures on the vent and low tidal volumes. Patient also back in A fib RVR in the 150s.

## 2019-10-15 NOTE — PROGRESS NOTES
Notified Dr. Ferny Katz of pupils being fixed and dilated and patient converting back into sinus rhythm in the 70s.

## 2019-10-15 NOTE — PROGRESS NOTES
A follow up visit was made to the patient. Emotional support, spiritual presence and  
prayer were provided for the patient. The patient is sedated and not alert. MABEL Muse

## 2019-10-15 NOTE — INTERDISCIPLINARY ROUNDS
Interdisciplinary team rounds were held 10/15/2019 with the following team members:Care Management, Nursing, Nurse Practitioner, Palliative Care, Pastoral Care, Pharmacy, Physician, Respiratory Therapy and Clinical Coordinator and the patient. Plan of care discussed. See clinical pathway and/or care plan for interventions and desired outcomes.

## 2019-10-15 NOTE — PROGRESS NOTES
Chart reviewed and pt discussed in am IDR after admission on 14th from [facility] for acute respiratory failure with hypoxia. Currently intubated/vent. Sedation and paralyzed per MD notes. Prone today. No family at bedside. CM will follow for any assist and d/c POC when medically stable. Care Management Interventions Transition of Care Consult (CM Consult): Discharge Planning Discharge Location Discharge Placement: Unable to determine at this time

## 2019-10-15 NOTE — PROGRESS NOTES
Nutrition: 
Reason for assessment: Consult for TF and lyte management per nutritional support protocols. (Dr Bob/[facility] Pulmonary) Assessment:  
Food/Nutrition History: PMH: MS and scleroderma, SADIE, PE June 2019, DM, esophagitis  And ulceration, GERD Admitted to [facility] 9-26 for sepsis, PNA, +rhinovirus, respiratory failure requiring intubation and paralysis, MAE. Extubated 10-3. 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 MRI on Oct 4 which showed multiple, probable, MS lesions in the cerebrum and c spine (stable) Required reintubation 10-8. Trach and PEG 10-10-19. Transferred to [facility] on 10-12 and then from [facility] yesterday for ICU paralyzation. On admission to [facility] had flexiseal and residuals noted to be > 500 ml so TF was held the night prior to transfer. TF of Vital 1.2 AF was started at 10 ml/hr and had progressed to 20 ml/hr with 20 ml water flush and residuals were in the 200's per [address] RD. PEG place,  Abdomen soft with absent bowel sounds. Date of Last BM: 10-14 ( 300 ml). Pt not appropriate for TF while in prone position. Central line: Quad lumen right IJ Pertinent Medications: IVF: NS at 50 ml/hr, Tracrium, Maxipime, Pepcid, Fentanyl, Lasix, Lantus, SSI, Solumedrol, Flagyl, Diprivan Pertinent labs: Na 152, Cl 115, K 3.5, , Creat 1.76 
POC Glucoses:  157-331 mg/dl Anthropometrics:Height: 5' 2.99\" (160 cm), Weight Source: Bed, Weight: 88 kg (194 lb 0.1 oz), Body mass index is 34.38 kg/m². BMI class of overweight for age >71 Edema: None noted Macronutrient needs: (IBW/52.2kg) EER:  9089-7442 kcal /day (25-30 kcal/kg IBW) 
EPR:  70-88 grams protein/day (0.8-1 grams/kg ABW) Max CHO:  196 grams/day (50% kcal) Fluid:  1ml/kcal 
Intake/Comparative standards: Current NPO status does not meet kcal or protein needs Nutrition Diagnosis: Inadequate protein-energy intake r/t inability to tolerate adequate or any TF as evidenced by prior TF intolerance with low rate TF and now proning prevents any TF administration. Intervention: 
Meals and snacks: NPO 
EN: Contraindicated at this this. Nutrition Supplement Therapy: Electrolyte replacement per nutritional support protocols are active on MAR. PN: Consider TPN is pt continues to require proning. Please order nutrition consult for TPN management if TPN is pursued. Coordination of nutrition care: Discussed with Jakob Kyle RN and Sundar Navarrete RN. Discharge nutrition plan: Too soon to determine. Anthony Lubin, [address] [place] [address], [address] [place] [address], [address] [place] [address]

## 2019-10-15 NOTE — PROGRESS NOTES
Patient arrived with a #8.0 tracheostomy. Patient is sedated. Patient is not able to follow commands. Breath sounds are coarse. Trachea is midline, Negative for subcutaneous air, and chest excursion is symmetric. Patient is also Negative for cyanosis and is Negative for pitting edema. All alarms are set and audible. Resuscitation bag is at the head of the bed. Ventilator Settings Mode FIO2 Rate Tidal Volume Pressure PEEP I:E Ratio Pressure control  80 %   26    32 cm H2O 10 cm H20  1:2.5 Peak airway pressure: 42 cm H2O Minute ventilation: 7.5 l/min Abner Mon RT

## 2019-10-15 NOTE — PROGRESS NOTES
Pupils noted to be 4mm bilaterally and nonreactive. BIS 38-40 on 2.5mcg/kg/min of Propofol and 25mcg/hr of Fentanyl. TOF 0/4 on 3mcg/kg/min of Tracrium. Dr. Mohan Chirinos notified and orders received to not decrease Tracrium anymore until TOF > 0 and not to titrate Fentanyl or Propofol gtts until BIS > 60. Pts HR 66-72 off Cardizem gtt but she has started to have frequent PACs and PVCs. Cardizem gtt restarted. Dr. Mohan Chirinos made aware.

## 2019-10-15 NOTE — PROGRESS NOTES
Patient peak pressures 50-52. Vent changes made and documented in flow sheet. Changes called to Dr. Leonor Holm, and orders received to draw abg in 2 hours.

## 2019-10-15 NOTE — CDMP QUERY
Patient admitted with acute respiratory failure with hypoxia. Documentation reflects sepsis in note dated 10/14. If possible, please specify in the progress notes and d/c summary if sepsis was: 
 
? Ruled out after study ? Still Suspected after study ? Confirmed after study The medical record reflects the following: 
  Risk Factors: age, history of multiple sclerosis and scleroderma, depression, anemia, patient transferred to ICU from [facility] for acute respiratory failure Clinical Indicators: per consult note on 10/14 @ 21  by Dr. Constantino Blake \" Sepsis suspected with leukocytosis / elevated procalcitonin\", WBC-16.4 on 10/14 @ 0700, T-96 axillary with RR-25 on 10/15 @ 1113 Treatment: Maxipime, Flagyl, continue to monitor Thanks, 
EARLE JimenezN, RN, CDS Compliant Documentation Management Program 
[phone]

## 2019-10-15 NOTE — PROGRESS NOTES
Critical Care Daily Progress Note: 10/15/2019 Admission Date: 10/14/2019 The patient's chart is reviewed and the patient is discussed with the staff. 
 
 
69 y.o. female being transferred to the ICU from the [facility]). She was transferred there on Saturday but experienced need for increased oxygen support during transport (85% up to 100%). She had been hospitalized at [facility] on Sept 23rd with confusion. She was felt to be septic and had an elevated WBC and lactic acid level. She was hypoxic and her CXR showed bilateral infiltrates. She was treated for pneumonia (? Bacterial versus viral versus aspiration). The viral panel was positive for rhinovirus. Her CXR worsened on 9/26 and she required intubation on 9/27. She required paralysis for ventilation due to respiratory distress. The pulmonary infiltrates progressed and she was felt to have ARDS. She was started on lasix for suspected volume overload. Echo shows an EF 55 to 60 mmHg. She has MAE and is being followed by nephrology.      
  
  She gradually improved so was extubated on 10/3. She was noted to be confused and profoundly weak so was seen in consultation by neurology. 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 Nerve conduction studies and EMG were completed which revealed primary axonal loss as opposed to demyelinating syndrome more consistent with [facility] as opposed to [facility]. She has received 5 doses of IVIG. She had a brain MRI on Oct 4 which showed multiple, probable, MS lesions in the cerebrum and c spine (stable from previous examinations).  
  
   She developed acute respiratory failure on 10/8 requiring reintubation. She underwent trach and PEG placement on 10/10/19. She has been tube feeding dependent but has not only had diarrhea but high gastric residuals.  The TF is currently on hold.  
  
   She underwent therapeutic bronchoscopy on 10/8 and 10/11 for mucus plugging. Bronchial washings reported enterococcus. She was restarted on antibiotics at the time of transfer to [facility] and recultured. All cultures from there are negative so far. She remains febrile and has a leukocytosis. Procalcitonin is 14.0. She is requiring high amount of peep and FIO2 for oxygenation. She remains in respiratory distress. She has been sedated with Fentanyl. She developed rapid a fib after transfer so is now on a cardizem infusion and cardiology is following.  
  
   Her PMH is significant for multiple sclerosis and scleroderma. She is followed by Rheumatogy and treated with Plaquenil and 5 mg of Prednisone per day. She is monitored for pulmonary hypertension and RVSP in the past has measured 40 mmHg but more recent echo showed a RVSP of 32 mmHg. She also has SADIE with home CPAP that was started last year.  
  
    She was diagnosed with a pulmonary embolism in June of 2019. She was on Eliquis as an outpatient but is currently on Lovenox Subjective: On vent support -fio2 0.8   P/f  = 148,  Flolan started last pm and the pt is sedated and paralyzed Current Facility-Administered Medications Medication Dose Route Frequency  sodium chloride (NS) flush 5-40 mL  5-40 mL IntraVENous Q8H  
 sodium chloride (NS) flush 5-40 mL  5-40 mL IntraVENous PRN  
 atracurium (TRACRIUM) 1,000 mg in 0.9% sodium chloride 250 mL infusion  0-15 mcg/kg/min IntraVENous TITRATE  dilTIAZem (CARDIZEM) 100 mg in 0.9% sodium chloride (MBP/ADV) 100 mL infusion  0-15 mg/hr IntraVENous TITRATE  carboxymethylcellulose sodium (REFRESH LIQUIGEL) 1 % ophthalmic solution 1 Drop  1 Drop Both Eyes Q4H  
 acetaminophen (TYLENOL) tablet 650 mg  650 mg Per G Tube Q6H PRN  
 amantadine HCl (SYMMETREL) capsule 100 mg  100 mg Per G Tube BID  chlorhexidine (PERIDEX) 0.12 % mouthwash 15 mL  15 mL Swish and Spit Q12H  
 enoxaparin (LOVENOX) injection 90 mg  90 mg SubCUTAneous Q24H  furosemide (LASIX) injection 40 mg  40 mg IntraVENous Q12H  
 [START ON 10/16/2019] glatiramer (COPAXONE) injection 40 mg (Patient Supplied)  40 mg SubCUTAneous Q MON, WED & FRI  hydroxychloroquine (PLAQUENIL) tablet 200 mg  200 mg Per G Tube DAILY  insulin glargine (LANTUS) injection 15 Units  15 Units SubCUTAneous DAILY  methylPREDNISolone (PF) (SOLU-MEDROL) injection 40 mg  40 mg IntraVENous Q8H  
 PARoxetine (PAXIL) tablet 20 mg  20 mg Per G Tube DAILY  insulin lispro (HUMALOG) injection   SubCUTAneous Q6H  
 famotidine (PF) (PEPCID) 20 mg in sodium chloride 0.9% 10 mL injection  20 mg IntraVENous Q24H  cefepime (MAXIPIME) 2 g in 0.9% sodium chloride (MBP/ADV) 100 mL  2 g IntraVENous Q12H  
 metroNIDAZOLE (FLAGYL) IVPB premix 500 mg  500 mg IntraVENous Q12H  
 fentaNYL in normal saline (pf) 25 mcg/mL infusion  0-200 mcg/hr IntraVENous TITRATE  propofol (DIPRIVAN) infusion  0-50 mcg/kg/min IntraVENous TITRATE  epoprostenol (FLOLAN) 30,000 ng/mL in diluent for epoprostenol (gly) 50 mL solution  10-50 ng/kg/min (Ideal) Nebulization TITRATE  
 0.9% Sodium Chloride for Flolan Infusion  8 mL/hr Nebulization TITRATE Review of Systems Unobtainable due to patient status. Objective:  
 
Vitals:  
 10/15/19 5727 10/15/19 9332 10/15/19 7501 10/15/19 0827 BP: 136/64 129/56 125/62 123/57 Pulse: (!) 52 (!) 51 (!) 50 (!) 50 Resp: 26 26 26 26 Temp:      
SpO2: 97% 100% 100% 100% Weight:      
Height:      
 
 
 
Intake/Output Summary (Last 24 hours) at 10/15/2019 0848 Last data filed at 10/15/2019 9256 Gross per 24 hour Intake 668.44 ml Output 890 ml Net -221.56 ml Physical Exam:         
Constitutional:  intubated and mechanically ventilated. EENMT:  Sclera clear, pupils equal, oral mucosa moist 
Respiratory: crackles bilateral 
Cardiovascular:  RRR with no M,G,R; 
Gastrointestinal:  soft with no tenderness; positive bowel sounds present Musculoskeletal:  warm with no cyanosis, no lower extremity edema Skin:  no jaundice or ecchymosis Neurologic: no gross neuro deficits Psychiatric:  Sedated and paraluyzed LINES:   
ETT 
 
DRIPS:   
Tracrium, propofol. Fentanyl, CXR:   
 
 
Ventilator Settings Mode FIO2 Rate Tidal Volume Pressure PEEP PRVC  90 %    350 ml     10 cm H20 Peak airway pressure: 47 cm H2O Minute ventilation: 9.5 l/min ABG:  
Recent Labs 10/15/19 
0333 10/15/19 
0040 10/14/19 
1617 PHI 7.323* 7.255* 7.273* PCO2I 48.6* 58.9* 60.6*  
PO2I 119* 144* 84  
HCO3I 25.2 26.1* 28.0*  
  
 
LAB Recent Labs 10/15/19 
0427 10/14/19 
2209 10/14/19 
1813 GLUCPOC 220* 331* 299* Recent Labs 10/15/19 
0311 10/14/19 
1759 10/14/19 
0700 10/13/19 
9658 WBC 12.1*  --  16.4* 18.0* HGB 7.7*  --  8.5* 9.6* HCT 26.6*  --  28.5* 33.7*  
  --  326 277 INR  --  1.4  --   --   
 
Recent Labs 10/15/19 
0311 10/14/19 
1759 10/14/19 
0700 10/13/19 
0712 10/12/19 
1557 * 150* 150* 149* 146*  
K 3.5 3.9 4.0 4.9 4.9  
* 114* 115* 118* 119* CO2 27 31 26 21 21 * 315* 296* 209* 134* * 104* 97* 86* 68* CREA 1.76* 1.59* 1.49* 1.47* 1.21* MG  --   --   --  2.8* 2.7*  
CA 7.9* 8.1* 8.2* 8.6 8.3 ALB 1.6*  --  1.7* 1.6* 1.5* SGOT 9*  --  27 29 35 Recent Labs 10/12/19 
1557 LAC 1.4 Assessment:  (Medical Decision Making) Hospital Problems  Never Reviewed Codes Class Noted POA * (Principal) Acute respiratory failure with hypoxia ([facility] ICD-10-CM: J96.01 
ICD-9-CM: 518.81  10/14/2019 Yes Critically ill on flolan, 80% fio2 Multiple sclerosis ([facility] (Chronic) ICD-10-CM: G35 
ICD-9-CM: 340  10/14/2019 Yes Critical illness polyneuropathy ([facility] ICD-10-CM: Y53.63 ICD-9-CM: 357.82  10/14/2019 Yes New onset atrial fibrillation [facility]) ICD-10-CM: I48.91 
ICD-9-CM: 427.31  10/14/2019 Yes Scleroderma (HCC) (Chronic) ICD-10-CM: M34.9 ICD-9-CM: 710.1  10/14/2019 Yes Fever ICD-10-CM: R50.9 ICD-9-CM: 780.60  10/14/2019 Yes Hypernatremia ICD-10-CM: E87.0 ICD-9-CM: 276.0  10/14/2019 Yes [address] [place] Anemia ICD-10-CM: D64.9 ICD-9-CM: 285.9  10/14/2019 Yes History of pulmonary embolism ICD-10-CM: Z86.711 ICD-9-CM: V12.55  10/14/2019 Yes Overview Signed 10/14/2019  1:46 PM by Carolann Mason NP Dx June 2019 Diarrhea ICD-10-CM: R19.7 ICD-9-CM: 787.91  10/14/2019 Yes  
 c dif neg Diabetes ([facility] (Chronic) ICD-10-CM: E11.9 ICD-9-CM: 250.00  10/14/2019 Yes HTN (hypertension) (Chronic) ICD-10-CM: I10 
ICD-9-CM: 401.9  10/14/2019 Yes SADIE (obstructive sleep apnea) (Chronic) ICD-10-CM: G47.33 
ICD-9-CM: 327.23  10/14/2019 Yes Overview Addendum 10/14/2019  1:47 PM by Carolann Mason NP  
  PSG 2018 AHI 9.5/hour Home autopap min + 9 max, +15 MAE (acute kidney injury) ([facility] ICD-10-CM: N17.9 ICD-9-CM: 584.9  10/14/2019 Yes Cr increased Pulmonary hypertension (HCC) (Chronic) ICD-10-CM: I27.20 ICD-9-CM: 416.8  10/14/2019 Yes Long term (current) use of systemic steroids (Chronic) ICD-10-CM: Z79.52 
ICD-9-CM: V58.65  10/14/2019 Yes Plan:  (Medical Decision Making) 1    Flolan 2    Prone for 16 hours if ok 3    Wean fio2 if it improves 4    Maxipime, flagyl day 2 
5    Iv solumedrol 6    Iv fluids- add free h20 
-- 
Critical care time 46 minutes More than 50% of the time documented was spent in face-to-face contact with the patient and in the care of the patient on the floor/unit where the patient is located.  
 
Louisa Salazar MD

## 2019-10-16 PROBLEM — B34.8 RHINOVIRUS INFECTION: Status: ACTIVE | Noted: 2019-01-01

## 2019-10-16 PROBLEM — J80 ARDS (ADULT RESPIRATORY DISTRESS SYNDROME) (HCC): Status: ACTIVE | Noted: 2019-01-01

## 2019-10-16 PROBLEM — J18.9 PNEUMONIA DUE TO INFECTIOUS ORGANISM: Status: ACTIVE | Noted: 2019-01-01

## 2019-10-16 NOTE — CONSULTS
[facility] CONSULT NOTE Admission Date: 
10/14/2019 Admission Diagnosis: 
Acute respiratory failure with hypoxia ([facility] [address] [J96.01] Consulting physician:  DR Amira Chow Reason for consult:  Solomon Subjective:  
History of Present Illness: 
 
68 y/o female with PMH of MS/ Scleroderma Sekou Hay Milly /SADIE Vallorie Lyme Rayna Aris HTn / Long term steroid use /AF Darin Rea is here now in [facility] ICu transferred from [facility] . She had prolonged hospitalization at [facility] . She was getting treated for B/l pneumonia /ARDS. She was extubated on 10/3 and re intubated on 10/8 . She was trach and PEGed on 10/10 . She was treated with abx for enterococcus . In regards to renal function , her BUIn /cr on 10/12 was 1.2 which has gradually increased to 137/2.02 today . She on vent support for ARDS along with solumedrol , CXR shows b/l lung infiltrates versus edema - stable on vent Severe hypoalbuminemia with anasarca present . Her lasix has been held this morning secondary to decline in renal function No past medical history on file. Past Surgical History:  
Procedure Laterality Date  HX BACK SURGERY    
 HX CHOLECYSTECTOMY  HX COLONOSCOPY    
 HX OTHER SURGICAL    
 laminectomy - ? level Current Facility-Administered Medications Medication Dose Route Frequency  NUTRITIONAL SUPPORT ELECTROLYTE PRN ORDERS   Does Not Apply PRN  
 furosemide (LASIX) injection 60 mg  60 mg IntraVENous Q8H  
 dextrose 5% infusion  50 mL/hr IntraVENous CONTINUOUS  
 sodium chloride (NS) flush 5-40 mL  5-40 mL IntraVENous Q8H  
 sodium chloride (NS) flush 5-40 mL  5-40 mL IntraVENous PRN  
 atracurium (TRACRIUM) 1,000 mg in 0.9% sodium chloride 250 mL infusion  0-20 mcg/kg/min IntraVENous TITRATE  dilTIAZem (CARDIZEM) 100 mg in 0.9% sodium chloride (MBP/ADV) 100 mL infusion  0-15 mg/hr IntraVENous TITRATE  carboxymethylcellulose sodium (REFRESH LIQUIGEL) 1 % ophthalmic solution 1 Drop  1 Drop Both Eyes Q4H  
 acetaminophen (TYLENOL) tablet 650 mg  650 mg Per G Tube Q6H PRN  
 amantadine HCl (SYMMETREL) capsule 100 mg  100 mg Per G Tube BID  chlorhexidine (PERIDEX) 0.12 % mouthwash 15 mL  15 mL Swish and Spit Q12H  
 enoxaparin (LOVENOX) injection 90 mg  90 mg SubCUTAneous Q24H  
 glatiramer (COPAXONE) injection 40 mg (Patient Supplied)  40 mg SubCUTAneous Q MON, WED & FRI  hydroxychloroquine (PLAQUENIL) tablet 200 mg  200 mg Per G Tube DAILY  insulin glargine (LANTUS) injection 15 Units  15 Units SubCUTAneous DAILY  methylPREDNISolone (PF) (SOLU-MEDROL) injection 40 mg  40 mg IntraVENous Q8H  
 PARoxetine (PAXIL) tablet 20 mg  20 mg Per G Tube DAILY  insulin lispro (HUMALOG) injection   SubCUTAneous Q6H  
 famotidine (PF) (PEPCID) 20 mg in sodium chloride 0.9% 10 mL injection  20 mg IntraVENous Q24H  cefepime (MAXIPIME) 2 g in 0.9% sodium chloride (MBP/ADV) 100 mL  2 g IntraVENous Q12H  
 metroNIDAZOLE (FLAGYL) IVPB premix 500 mg  500 mg IntraVENous Q12H  
 fentaNYL in normal saline (pf) 25 mcg/mL infusion  0-200 mcg/hr IntraVENous TITRATE  propofol (DIPRIVAN) infusion  0-50 mcg/kg/min IntraVENous TITRATE  epoprostenol (FLOLAN) 30,000 ng/mL in diluent for epoprostenol (gly) 50 mL solution  10-50 ng/kg/min (Ideal) Nebulization TITRATE  
 0.9% Sodium Chloride for Flolan Infusion  8 mL/hr Nebulization TITRATE Allergies Allergen Reactions  Aspirin Unable to Obtain  Codeine Unable to Obtain  Propoxyphene Unable to Obtain Social History Tobacco Use  Smoking status: Not on file Substance Use Topics  Alcohol use: Not on file No family history on file. Review of Systems UTO - intubated Objective:  
Vitals:  
 10/16/19 0958 10/16/19 1012 10/16/19 1027 10/16/19 1043 BP: 152/68 151/67 149/68 149/67 Pulse: (!) 59 60 60 62 Resp: 30 30 30 30 Temp:      
SpO2: 95% 93% 93% 94% Weight:      
Height: Intake/Output Summary (Last 24 hours) at 10/16/2019 1053 Last data filed at 10/16/2019 2569 Gross per 24 hour Intake 1968.41 ml Output 1025 ml Net 943.41 ml Physical Exam 
GEN :Trach and PEG ed HEENT: anicteric sclerae, eomi. Oropharynx without lesions. Mucous membranes are moist. 
Neck - supple without JVD, no thyromegaly. No lymphadenopathy. CV - regular rate and rhythm, no murmur, no rub Lung - clear bilaterally, lungs expand symmetrically Chest wall - normal appearance Abd - soft, nontender, bowel sounds present, no hepatosplenomegaly Ext - edema present Data Review:  
Recent Labs 10/16/19 
0319 10/15/19 
0311 10/14/19 
0700 WBC 14.7* 12.1* 16.4* HGB 7.6* 7.7* 8.5* HCT 25.5* 26.6* 28.5*  
 222 326 Recent Labs 10/16/19 
0319 10/15/19 
1423 10/15/19 
0311 10/14/19 
1759 * 152* 152* 150*  
K 3.5  --  3.5 3.9 *  --  115* 114* CO2 26  --  27 31 *  --  116* 104* CREA 2.02*  --  1.76* 1.59* *  --  219* 315* CA 8.0*  --  7.9* 8.1* No results for input(s): PH, PCO2, PO2, PCO2 in the last 72 hours. Problem List:  
 
Patient Active Problem List  
 Diagnosis Date Noted  Rhinovirus infection 10/16/2019  ARDS (adult respiratory distress syndrome) ([facility] [address] 10/16/2019  Pneumonia due to infectious organism 10/16/2019  Acute respiratory failure with hypoxia ([facility] [address] 10/14/2019  Multiple sclerosis ([facility] [address] 10/14/2019  Critical illness polyneuropathy ([facility] [address] 10/14/2019  New onset atrial fibrillation ([address] 10/14/2019  Scleroderma ([facility] [address] 10/14/2019  Fever 10/14/2019  Hypernatremia 10/14/2019  Anemia 10/14/2019  
 History of pulmonary embolism 10/14/2019  Diarrhea 10/14/2019  Diabetes ([facility] [address] 10/14/2019  GERD (gastroesophageal reflux disease) 10/14/2019  Anxiety 10/14/2019  Depression 10/14/2019  Hyperlipidemia 10/14/2019  
 HTN (hypertension) 10/14/2019  SADIE (obstructive sleep apnea) 10/14/2019  MAE (acute kidney injury) ([facility] [address] 10/14/2019  Osteoarthritis 10/14/2019  Pulmonary hypertension ([facility] [address] 10/14/2019  
 History of esophagitis 10/14/2019  Long term (current) use of systemic steroids 10/14/2019 Assessment and Plan: 1. Mae over CKD , non oliguric - MAE possibly secondary to ATN from poor hemodynamic status  
- will repeat UA  
- Lasix held , has been receiving 60 mg q 8 hrs . Electrolytes wnl , will reevaluate renal function in am to see whether  there is any improvement after holding lasix - Anasarca from severe hypoalbuminemia present 2. ARDS On solumedrol and vent 3. MS [place] On glatiramer Discussed elaborately with sister and daughter about her current critical situation . Family is very understanding about her . Sister , Brother in law and son  are going to to here in couple of hours . They wanted to see her in iCu and wanted to make a combined decision on further care of the pt . Will await for their decision Carolina Gusman MD

## 2019-10-16 NOTE — PROGRESS NOTES
Bedside and Verbal shift change report given to Ryan Casas RN 
 (oncoming nurse) by Norman Noland RN and LORETTA Mcbride (offgoing nurse). Report included the following information SBAR, Kardex, Intake/Output, MAR and Recent Results.

## 2019-10-16 NOTE — PROGRESS NOTES
Ventilator check complete; patient has a #8.0 tracheostomy. Patient is sedated. Patient is not able to follow commands. Breath sounds are coarse. Trachea is midline, Negative for subcutaneous air, and chest excursion is symmetric. Patient is also Negative for cyanosis and is Positive for pitting edema. All alarms are set and audible. Resuscitation bag is at the head of the bed. Ventilator Settings Mode FIO2 Rate Tidal Volume Pressure PEEP I:E Ratio Pressure control  70 %  30 
     28 10 cm H20  1:1.7 Peak airway pressure: 38 cm H2O Minute ventilation: 8.5 l/min ABG: No results for input(s): PH, PCO2, PO2, HCO3 in the last 72 hours. Ruddy Hyde

## 2019-10-16 NOTE — PROGRESS NOTES
A follow up visit was made to the patient. Emotional support, spiritual presence and  
prayer were provided for the patient. She is medically paralyzed. MABEL Pierce

## 2019-10-16 NOTE — INTERDISCIPLINARY ROUNDS
Interdisciplinary team rounds were held 10/16/2019 with the following team members:Care Management, Nursing, Nurse Practitioner, Nutrition, Palliative Care, Pastoral Care, Pharmacy, Physician, Respiratory Therapy and Clinical Coordinator. Plan of care discussed. See clinical pathway and/or care plan for interventions and desired outcomes.

## 2019-10-16 NOTE — PROGRESS NOTES
This nurse called pharmacy after hanging new bag of Flolan, that another bag would need to be prepared, spoke to [employer].

## 2019-10-16 NOTE — PROGRESS NOTES
Bedside, Verbal and Written shift change report given to Joy Chawla RN (oncoming nurse) by Quang Orozco RN (offgoing nurse). Report included the following information SBAR, Kardex, ED Summary, Procedure Summary, Intake/Output, MAR, Med Rec Status, Cardiac Rhythm SB and Alarm Parameters . Fent gtt signed off. Upon assessment, Pt temp 94.6 oral, barehugger applied.

## 2019-10-16 NOTE — PROGRESS NOTES
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 
· S/p trach/PEG, 10/10/19 · H/o PE - on anticoagulation · Multiple sclerosis · Scleroderma Plan:  
· Continue cefepime / flagyl overnight but plan to stop tomorrow if she remains stable. Anti-infectives: · IV cefepime · IV flagyl Subjective:  
Date of Consultation:  2019 Referring Physician: Dr. Susie Martinez More stable on vent; now hypothermic; Allergies Allergen Reactions  Aspirin Unable to Obtain  Codeine Unable to Obtain  Propoxyphene Unable to Obtain Review of Systems:  Review of systems not obtained due to patient factors. Objective:  
 
Visit Vitals /70 Pulse 65 Temp 97.6 °F (36.4 °C) Resp 21 Ht 5' 2.99\" (1.6 m) Wt 88 kg (194 lb 0.1 oz) SpO2 93% BMI 34.38 kg/m² Temp (24hrs), Av.1 °F (36.2 °C), Min:94.6 °F (34.8 °C), Max:97.7 °F (36.5 °C) Lines:  Central Venous Catheter:    
 
Physical Exam:   
General:  Sedated and on the ventilator. Facial swelling noted;  
Eyes:  Sclera anicteric Mouth/Throat: Tongue swollen Neck: Supple. Tracheostomy site clean  
Lungs:   Coarse breath sounds, diminished posteriorly; no wheezing or rhonchi noted;  
Cardiovascular:  Nl rate, regular rhythm, no murmur Abdomen:   Soft, non-tender, bowel sounds hypo-active, non-distended. PEG site clean Extremities: No cyanosis; moderate diffuse edema noted Skin: No acute rash or lesions. Lymph Nodes: Musculoskeletal:  No swelling or deformity. Lines/Devices:  Intact, no erythema, drainage, or tenderness. Psychiatric: Sedated and appears in distress Data Review: CBC: 
Recent Labs 10/16/19 
0319 10/15/19 
0311 10/14/19 
0700 WBC 14.7* 12.1* 16.4* GRANS  --  90* 92* MONOS  --  5 3* EOS  --  0* 0* ANEU  --  10.9* 15.1* ABL  --  0.4* 0.6 HGB 7.6* 7.7* 8.5* HCT 25.5* 26.6* 28.5*  
 222 326 BMP: 
Recent Labs 10/16/19 
0319 10/15/19 
1423 10/15/19 
0311 10/14/19 
1759 CREA 2.02*  --  1.76* 1.59* *  --  116* 104* * 152* 152* 150*  
K 3.5  --  3.5 3.9 *  --  115* 114* CO2 26  --  27 31 AGAP 9  --  10 5* *  --  219* 315* LFTS: 
Recent Labs 10/16/19 
0319 10/15/19 
0311 10/14/19 
0700 TBILI 0.4 0.5 0.4 ALT 11* 8* 10* SGOT 11* 9* 27  151* 197* TP 6.3 6.3 7.3 ALB 1.6* 1.6* 1.7* Microbiology:  
 
All Micro Results Procedure Component Value Units Date/Time CRYPTOCOCCUS AG W/REFLEX TITER [[id number]] Collected:  10/14/19 1759 Order Status:  Completed Specimen:  Serum Updated:  10/15/19 1346 Cryptococcus Ag NEGATIVE Imaging:  
10/16/19 CXR: FINDINGS: Diffuse bilateral lung edema or infiltrates are unchanged. The cardiac 
size is stable. No pneumothorax or pleural effusion is seen. The tracheostomy 
tube and right jugular central line remain in place. Signed By: Abner Rodriguez MD   
 October 16, 2019

## 2019-10-16 NOTE — PROGRESS NOTES
Nutrition F/U: 
Assessment: 
Weight 88 kg (ICU bed 10/15/19), edema - anasarca. The patient remains intubated, ventilated, sedated and paralyzed. No nutrition support since transfer to [facility] ICU and was off her TF probably since before her PEG was placed on 10/10. Labs are remarkable for sodium 149 (on D5W @ 50 ml/hr), potassium 3.5 and AM glucose 187 (on Solumedrol). POC glucose (10/15) 220,157,189,152 and (10/16) 442,885,955. IV Access: 
 Right IJ QLC. Enteral Access: 
 PEG. Macronutrient needs: (IBW/52.2kg) EER:  8539-1879 kcal /day (25-30 kcal/kg IBW) 
EPR:  70-88 grams protein/day (0.8-1 grams/kg ABW) Max CHO:  196 grams/day (50% kcal) Fluid:  1ml/kcal 
Intake/Comparative Standards: 
Current intake of dextrose-based IVF contributes 204 calories/day. Diagnosis: 
Inadequate energy intake related to altered GI function as evidenced by TF intolerance for days and now the patient is chemically paralyzed. Intervention:  
Meals and Snacks: NPO. Enteral Nutrition: TF remains on hold. Parenteral Nutrition/IV Fluid: Recommend: Start TPN to provide nutritional support until able to use PEG for feeding. Mineral Supplement Therapy: Nutrition Support Orders/Electrolyte Management Replacement Protocols are active on the MAR. Coordination of Nutrition Care by a Nutrition Professional: AM ICU rounds, collaboration with Ene Quick. Nutrition Discharge Plan: Too soon to determine. Nereida Oliveros. Rebecca Books 
[phone]

## 2019-10-16 NOTE — PROGRESS NOTES
Critical Care Daily Progress Note: 10/16/2019 Admission Date: 10/14/2019 The patient's chart is reviewed and the patient is discussed with the staff. 
 
 
69 y. o. female being transferred to the ICU from the [facility]). She was transferred there on Saturday but experienced need for increased oxygen support during transport (85% up to 100%). She had been hospitalized at [facility] on Sept 23rd with confusion. She was felt to be septic and had an elevated WBC and lactic acid level. She was hypoxic and her CXR showed bilateral infiltrates.  She was treated for pneumonia (? Bacterial versus viral versus aspiration). The viral panel was positive for rhinovirus. Her CXR worsened on 9/26 and she required intubation on 9/27.  She required paralysis for ventilation due to respiratory distress. The pulmonary infiltrates progressed and she was felt to have ARDS. She was started on lasix for suspected volume overload. Echo shows an EF 55 to 60 mmHg. She has MAE and is being followed by nephrology.       
  
  She gradually improved so was extubated on 10/3. She was noted to be confused and profoundly weak so was seen in consultation by neurology. 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 Nerve conduction studies and EMG were completed which revealed primary axonal loss as opposed to demyelinating syndrome more consistent with CIP as opposed to Clary Dillan has received 5 doses of IVIG. She had a brain MRI on Oct 4 which showed multiple, probable, MS lesions in the cerebrum and c spine (stable from previous examinations).   
  
   She developed acute respiratory failure on 10/8 requiring reintubation. She underwent trach and PEG placement on 10/10/19.  She has been tube feeding dependent but has not only had diarrhea but high gastric residuals.  The TF is currently on hold.  
  
   She underwent therapeutic bronchoscopy on 10/8 and 10/11 for mucus plugging. Bronchial washings reported enterococcus. She was restarted on antibiotics at the time of transfer to [facility] and recultured. All cultures from there are negative so far. She remains febrile and has a leukocytosis. Procalcitonin is 14.0.  She is requiring high amount of peep and FIO2 for oxygenation. She remains in respiratory distress. She has been sedated with Fentanyl. She developed rapid a fib after transfer so is now on a cardizem infusion and cardiology is following.  
  
   Her PMH is significant for multiple sclerosis and scleroderma. She is followed by Rheumatogy and treated with Plaquenil and 5 mg of Prednisone per day. Diaz Owen is monitored for pulmonary hypertension and RVSP in the past has measured 40 mmHg but more recent echo showed a RVSP of 32 mmHg. She also has SADIE with home CPAP that was started last year.  
  
    She was diagnosed with a pulmonary embolism in June of 2019. She was on Eliquis as an outpatient but is currently on Lovenox Subjective: Attempted to prone yesterday, after a few hours we could not ventilate pt. -could only get TV of  100ml , vent changed out, cxr showed no ptx, she  Was able to be bagged easily, eventually it was determined the problem was clogging of filters due to flolan, Now has tv of 300 on pressure control of 28 P/f [id number] Current Facility-Administered Medications Medication Dose Route Frequency  NUTRITIONAL SUPPORT ELECTROLYTE PRN ORDERS   Does Not Apply PRN  
 furosemide (LASIX) injection 60 mg  60 mg IntraVENous Q8H  
 dextrose 5% infusion  50 mL/hr IntraVENous CONTINUOUS  
 sodium chloride (NS) flush 5-40 mL  5-40 mL IntraVENous Q8H  
 sodium chloride (NS) flush 5-40 mL  5-40 mL IntraVENous PRN  
 atracurium (TRACRIUM) 1,000 mg in 0.9% sodium chloride 250 mL infusion  0-20 mcg/kg/min IntraVENous TITRATE  dilTIAZem (CARDIZEM) 100 mg in 0.9% sodium chloride (MBP/ADV) 100 mL infusion  0-15 mg/hr IntraVENous TITRATE  carboxymethylcellulose sodium (REFRESH LIQUIGEL) 1 % ophthalmic solution 1 Drop  1 Drop Both Eyes Q4H  
 acetaminophen (TYLENOL) tablet 650 mg  650 mg Per G Tube Q6H PRN  
 amantadine HCl (SYMMETREL) capsule 100 mg  100 mg Per G Tube BID  chlorhexidine (PERIDEX) 0.12 % mouthwash 15 mL  15 mL Swish and Spit Q12H  
 enoxaparin (LOVENOX) injection 90 mg  90 mg SubCUTAneous Q24H  
 glatiramer (COPAXONE) injection 40 mg (Patient Supplied)  40 mg SubCUTAneous Q MON, WED & FRI  hydroxychloroquine (PLAQUENIL) tablet 200 mg  200 mg Per G Tube DAILY  insulin glargine (LANTUS) injection 15 Units  15 Units SubCUTAneous DAILY  methylPREDNISolone (PF) (SOLU-MEDROL) injection 40 mg  40 mg IntraVENous Q8H  
 PARoxetine (PAXIL) tablet 20 mg  20 mg Per G Tube DAILY  insulin lispro (HUMALOG) injection   SubCUTAneous Q6H  
 famotidine (PF) (PEPCID) 20 mg in sodium chloride 0.9% 10 mL injection  20 mg IntraVENous Q24H  cefepime (MAXIPIME) 2 g in 0.9% sodium chloride (MBP/ADV) 100 mL  2 g IntraVENous Q12H  
 metroNIDAZOLE (FLAGYL) IVPB premix 500 mg  500 mg IntraVENous Q12H  
 fentaNYL in normal saline (pf) 25 mcg/mL infusion  0-200 mcg/hr IntraVENous TITRATE  propofol (DIPRIVAN) infusion  0-50 mcg/kg/min IntraVENous TITRATE  epoprostenol (FLOLAN) 30,000 ng/mL in diluent for epoprostenol (gly) 50 mL solution  10-50 ng/kg/min (Ideal) Nebulization TITRATE  
 0.9% Sodium Chloride for Flolan Infusion  8 mL/hr Nebulization TITRATE Review of Systems Unobtainable due to patient status. Objective:  
 
Vitals:  
 10/16/19 5629 10/16/19 6936 10/16/19 2766 10/16/19 9517 BP: 132/56 133/60 130/55 131/60 Pulse: (!) 53 (!) 53 (!) 53 (!) 54 Resp: 30 30 30 30 Temp:      
SpO2: 93% 93% 93% 94% Weight:      
Height:      
 
 
 
Intake/Output Summary (Last 24 hours) at 10/16/2019 0506 Last data filed at 10/16/2019 1984 Gross per 24 hour Intake 2023.41 ml Output 1150 ml Net 873.41 ml  
 
 
 Physical Exam:         
Constitutional:  intubated and mechanically ventilated. EENMT:  Sclera clear, pupils equal, oral mucosa moist 
Respiratory: crackles bilateral 
Cardiovascular:  RRR with no M,G,R; 
Gastrointestinal:  soft with no tenderness; positive bowel sounds present Musculoskeletal:  warm with no cyanosis, no lower extremity edema Skin:  no jaundice or ecchymosis Neurologic: no gross neuro deficits Psychiatric:  Sedated and paralyzed LINES:   
ETT, central line DRIPS:   
Propofol 5, fentanyl 75, tracrium Flolan drip- inhalational 
 
CXR:   
 
 
Ventilator Settings Mode FIO2 Rate Tidal Volume Pressure PEEP Pressure control  70 %    350 ml     10 cm H20 Peak airway pressure: 39 cm H2O Minute ventilation: 9.36 l/min ABG:  
Recent Labs 10/16/19 
7862 10/15/19 
1755 10/15/19 
1639 PHI 7.276* 7.259* 7.002* PCO2I 50.0* 52.8* 112.4*  
PO2I 97 111* 335* HCO3I 23.3 23.7 27.8* LAB Recent Labs 10/16/19 
6675 10/15/19 
2327 10/15/19 
1757 10/15/19 
1205 10/15/19 
5886 GLUCPOC 156* Netelaan 258 Recent Labs 10/16/19 
0319 10/15/19 
0311 10/14/19 
1759 10/14/19 
0700 WBC 14.7* 12.1*  --  16.4* HGB 7.6* 7.7*  --  8.5* HCT 25.5* 26.6*  --  28.5*  
 222  --  326 INR  --   --  1.4  --   
 
Recent Labs 10/16/19 
0319 10/15/19 
1423 10/15/19 
0311 10/14/19 
1759 10/14/19 
0700 * 152* 152* 150* 150*  
K 3.5  --  3.5 3.9 4.0  
*  --  115* 114* 115* CO2 26  --  27 31 26 *  --  219* 315* 296* *  --  116* 104* 97* CREA 2.02*  --  1.76* 1.59* 1.49* CA 8.0*  --  7.9* 8.1* 8.2* ALB 1.6*  --  1.6*  --  1.7* SGOT 11*  --  9*  --  27 No results for input(s): LCAD, LAC in the last 72 hours. Assessment:  (Medical Decision Making) Hospital Problems  Never Reviewed Codes Class Noted POA  * (Principal) Acute respiratory failure with hypoxia (HCC) ICD-10-CM: J96.01 
 ICD-9-CM: 518.81  10/14/2019 Yes Critically ill on 70% fio2 and inhaled flolan Multiple sclerosis ([facility] (Chronic) ICD-10-CM: G35 
ICD-9-CM: 340  10/14/2019 Yes Critical illness polyneuropathy ([facility] ICD-10-CM: T88.54 ICD-9-CM: 357.82  10/14/2019 Yes New onset atrial fibrillation [facility]) ICD-10-CM: I48.91 
ICD-9-CM: 427.31  10/14/2019 Yes Scleroderma (HCC) (Chronic) ICD-10-CM: M34.9 ICD-9-CM: 710.1  10/14/2019 Yes Fever ICD-10-CM: R50.9 ICD-9-CM: 780.60  10/14/2019 Yes Hypernatremia ICD-10-CM: E87.0 ICD-9-CM: 276.0  10/14/2019 Yes [address] Anemia ICD-10-CM: D64.9 ICD-9-CM: 285.9  10/14/2019 Yes History of pulmonary embolism ICD-10-CM: Z86.711 ICD-9-CM: V12.55  10/14/2019 Yes Overview Signed 10/14/2019  1:46 PM by Jesus Cohen NP Dx June 2019 Diarrhea ICD-10-CM: R19.7 ICD-9-CM: 787.91  10/14/2019 Yes Diabetes ([facility] (Chronic) ICD-10-CM: E11.9 ICD-9-CM: 250.00  10/14/2019 Yes HTN (hypertension) (Chronic) ICD-10-CM: I10 
ICD-9-CM: 401.9  10/14/2019 Yes SADIE (obstructive sleep apnea) (Chronic) ICD-10-CM: G47.33 
ICD-9-CM: 327.23  10/14/2019 Yes Overview Addendum 10/14/2019  1:47 PM by Jesus Cohen NP  
  PSG 2018 AHI 9.5/hour Home autopap min + 9 max, +15 MAE (acute kidney injury) ([facility] ICD-10-CM: N17.9 ICD-9-CM: 584.9  10/14/2019 Yes Worse -cr 2.2 Pulmonary hypertension (HCC) (Chronic) ICD-10-CM: I27.20 ICD-9-CM: 416.8  10/14/2019 Yes Long term (current) use of systemic steroids (Chronic) ICD-10-CM: Z79.52 
ICD-9-CM: V58.65  10/14/2019 Yes Pneumonia due to rhinovirus Plan:  (Medical Decision Making) 1   Increase peep 2   Try to get to fio2 of 0.6 
3    Hold lasix- ask nephrology to see 4    Iv albumin may be needed 5--cefepime and flagyl Critical care time 41 minutes More than 50% of the time documented was spent in face-to-face contact with the patient and in the care of the patient on the floor/unit where the patient is located.  
 
Maame Jessica MD

## 2019-10-16 NOTE — PROGRESS NOTES
Daughter called. States she was asked to call and speak with intensivist, assume Dr. Sherry Fernandez. I called her back and spoke with her. Told her I didn't know exactly what Dr. Sherry Fernandez was hoping to discuss with her, but I gave her an update including severe ARDS on paralytics, flolan and attempt at prone positioning. Updated to worsening apparent renal failure, but still trying to get fluid off given bilateral infiltrates and CVP of 16. (Increased Lasix to 60 Q8 since + past 2 days thus far and changed 1/2NS to D5 for hypernatremia). She states her brother would be arriving tomorrow she thinks and could speak more about her case. She understood she was very sick and that she had high risk of death, but states they are hoping to give her a chance of recovery if possible.   
 
Cathy Hyatt MD

## 2019-10-17 NOTE — PROGRESS NOTES
Ventilator check complete; patient has a #8.0 tracheostomy. .  Breath sounds are coarse. Trachea is midline, Negative for subcutaneous air, and chest excursion is symmetric. Patient is also Negative for cyanosis and is Positive for pitting edema. All alarms are set and audible. Resuscitation bag is at the head of the bed. Ventilator Settings Mode FIO2 Rate Tidal Volume Pressure PEEP I:E Ratio Pressure control  80 %    292 ml(Flolan filter changed. Now.)     10 cm H20  1:1.7 Peak airway pressure: 38 cm H2O Minute ventilation: 8.4 l/min ABG: No results for input(s): PH, PCO2, PO2, HCO3 in the last 72 hours.  
 
 
Mariano Quiroga, RT

## 2019-10-17 NOTE — PROGRESS NOTES
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 
· S/p trach/PEG, 10/10/19 · H/o PE - on anticoagulation · Multiple sclerosis · Scleroderma Plan:  
· Stop antibiotics today and observe off of antibiotics. Anti-infectives: · IV cefepime · IV flagyl Subjective:  
Date of Consultation:  2019 Referring Physician: Dr. Guerrero Peers Afebrile; remains on high vent settings; Allergies Allergen Reactions  Aspirin Unable to Obtain  Codeine Unable to Obtain  Propoxyphene Unable to Obtain Review of Systems:  Review of systems not obtained due to patient factors. Objective:  
 
Visit Vitals /67 Pulse 72 Temp 98.1 °F (36.7 °C) Resp 30 Ht 5' 2.99\" (1.6 m) Wt 88 kg (194 lb 0.1 oz) SpO2 92% BMI 34.38 kg/m² Temp (24hrs), Av.9 °F (36.6 °C), Min:96.6 °F (35.9 °C), Max:98.7 °F (37.1 °C) Lines:  Central Venous Catheter:    
 
Physical Exam:   
General:  Sedated and on the ventilator. Facial swelling noted;  
Eyes:  Sclera anicteric Mouth/Throat: Tongue protrusion noted Neck: Supple. Tracheostomy site clean  
Lungs:   Breathing with the vent;  
Cardiovascular: Abdomen:     
Extremities: moderate diffuse edema noted Skin: No acute rash or lesions. Lymph Nodes: Musculoskeletal:  No swelling or deformity. Lines/Devices:  Intact, no erythema, drainage, or tenderness. Psychiatric: Sedated on ventilator Data Review: CBC: 
Recent Labs 10/17/19 
9315 10/16/19 
0319 10/15/19 
7026 WBC 14.5* 14.7* 12.1* GRANS  --   --  90* MONOS  --   --  5  
EOS  --   --  0* ANEU  --   --  10.9* ABL  --   --  0.4* HGB 7.6* 7.6* 7.7* HCT 25.9* 25.5* 26.6*  
 180 222 BMP: 
Recent Labs 10/17/19 
4527 10/16/19 
0319 10/15/19 
1423 10/15/19 
4017 CREA 2.25* 2.02*  --  1.76* * 137*  --  116* * 149* 152* 152* K 4.0 3.5  --  3.5 * 114*  --  115* CO2 24 26  --  27 AGAP 10 9  --  10  
* 187*  --  219* LFTS: 
Recent Labs 10/16/19 
0319 10/15/19 
6611 TBILI 0.4 0.5 ALT 11* 8* SGOT 11* 9*  151* TP 6.3 6.3 ALB 1.6* 1.6* Microbiology:  
 
All Micro Results Procedure Component Value Units Date/Time CRYPTOCOCCUS AG W/REFLEX TITER [[id number]] Collected:  10/14/19 1759 Order Status:  Completed Specimen:  Serum Updated:  10/15/19 1346 Cryptococcus Ag NEGATIVE Imaging:  
10/17/19 CXR: FINDINGS: Diffuse bilateral lung edema or infiltrates are unchanged. The cardiac 
size is stable. No pneumothorax or pleural effusion is seen. The tracheostomy 
tube and right jugular central line remain in place. Signed By: Abner Rodriguez MD   
 October 17, 2019

## 2019-10-17 NOTE — PROGRESS NOTES
Bedside report received from [place], [facility]. Fentanyl gtt and Flolan gtt dual verified.

## 2019-10-17 NOTE — PROGRESS NOTES
[facility] PROGRESS NOTE Follow up for: MAE over ckd Subjective:  
Patient seen and examined. Chart, notes, labs, imaging, results all reviewed. Intubated and paralyzed Sister and son present bedside ROS: 
UTO Objective:  
Exam: 
Vitals:  
 10/17/19 1892 10/17/19 4343 10/17/19 6904 10/17/19 0954 BP: 152/66 149/68 148/67 Pulse: 71 71 71 72 Resp: 30 30 30 30 Temp:      
SpO2: 91% 91% 91% 92% Weight:      
Height:      
 
 
 
Intake/Output Summary (Last 24 hours) at 10/17/2019 5597 Last data filed at 10/17/2019 7424 Gross per 24 hour Intake 2285.29 ml Output 800 ml Net 1485.29 ml  
 
 
Current Facility-Administered Medications Medication Dose Route Frequency  NUTRITIONAL SUPPORT ELECTROLYTE PRN ORDERS   Does Not Apply PRN  
 dextrose 5% infusion  50 mL/hr IntraVENous CONTINUOUS  
 sodium chloride (NS) flush 5-40 mL  5-40 mL IntraVENous Q8H  
 sodium chloride (NS) flush 5-40 mL  5-40 mL IntraVENous PRN  
 atracurium (TRACRIUM) 1,000 mg in 0.9% sodium chloride 250 mL infusion  0-20 mcg/kg/min IntraVENous TITRATE  dilTIAZem (CARDIZEM) 100 mg in 0.9% sodium chloride (MBP/ADV) 100 mL infusion  0-15 mg/hr IntraVENous TITRATE  carboxymethylcellulose sodium (REFRESH LIQUIGEL) 1 % ophthalmic solution 1 Drop  1 Drop Both Eyes Q4H  
 acetaminophen (TYLENOL) tablet 650 mg  650 mg Per G Tube Q6H PRN  
 amantadine HCl (SYMMETREL) capsule 100 mg  100 mg Per G Tube BID  chlorhexidine (PERIDEX) 0.12 % mouthwash 15 mL  15 mL Swish and Spit Q12H  
 enoxaparin (LOVENOX) injection 90 mg  90 mg SubCUTAneous Q24H  
 glatiramer (COPAXONE) injection 40 mg (Patient Supplied)  40 mg SubCUTAneous Q MON, WED & FRI  hydroxychloroquine (PLAQUENIL) tablet 200 mg  200 mg Per G Tube DAILY  insulin glargine (LANTUS) injection 15 Units  15 Units SubCUTAneous DAILY  methylPREDNISolone (PF) (SOLU-MEDROL) injection 40 mg  40 mg IntraVENous Q8H  
  PARoxetine (PAXIL) tablet 20 mg  20 mg Per G Tube DAILY  insulin lispro (HUMALOG) injection   SubCUTAneous Q6H  
 famotidine (PF) (PEPCID) 20 mg in sodium chloride 0.9% 10 mL injection  20 mg IntraVENous Q24H  cefepime (MAXIPIME) 2 g in 0.9% sodium chloride (MBP/ADV) 100 mL  2 g IntraVENous Q12H  
 metroNIDAZOLE (FLAGYL) IVPB premix 500 mg  500 mg IntraVENous Q12H  
 fentaNYL in normal saline (pf) 25 mcg/mL infusion  0-200 mcg/hr IntraVENous TITRATE  propofol (DIPRIVAN) infusion  0-50 mcg/kg/min IntraVENous TITRATE  epoprostenol (FLOLAN) 30,000 ng/mL in diluent for epoprostenol (gly) 50 mL solution  10-50 ng/kg/min (Ideal) Nebulization TITRATE  
 0.9% Sodium Chloride for Flolan Infusion  8 mL/hr Nebulization TITRATE  
 
 
EXAM 
GEN - intubated CV - S1, S2, RRR, no rub, murmur, or gallop Lung - clear to auscultation bilaterally Abd - soft, nontender, BS present Ext - edema present Recent Labs 10/17/19 
4932 10/16/19 
0319 10/15/19 
5387 WBC 14.5* 14.7* 12.1* HGB 7.6* 7.6* 7.7* HCT 25.9* 25.5* 26.6*  
 180 222 Recent Labs 10/17/19 
9789 10/16/19 
0319 10/15/19 
1423 10/15/19 
4241 * 149* 152* 152* K 4.0 3.5  --  3.5 * 114*  --  115* CO2 24 26  --  27 * 137*  --  116* CREA 2.25* 2.02*  --  1.76* CA 8.0* 8.0*  --  7.9*  
* 187*  --  219* Assessment and Plan: 1. Solomon over CKD , non oliguric - SOLOMON possibly secondary to ATN from poor hemodynamic status  
- will repeat UA  
- Lasix held , has been receiving 60 mg q 8 hrs . Electrolytes wnl ,  
- Anasarca from severe hypoalbuminemia present  
- discussed with family about the pt current renal status and explained that dialysis may not improve any of her critical illness at this time as she continues to have severe ARDS/MS related and CI polyneuropathy and asked them  about their plans for further care . family agreed that the pt has been going through severe medical issues for several months now and has declined significantly . They also agreed upon not doing any further futile aggressive measures  
  
2. ARDS On solumedrol and vent  
  
3. MS Jessica Guptan On glatiramer Discussed with DR Linda Ascencio MD

## 2019-10-17 NOTE — PROGRESS NOTES
Interdisciplinary team rounds were held 10/17/2019 with the following team members:Care Management, Nursing, Nurse Practitioner, Nutrition, Palliative Care, Pastoral Care, Pharmacy, Physical Therapy, Physician, Respiratory Therapy and Clinical Coordinator. Plan of care discussed. See clinical pathway and/or care plan for interventions and desired outcomes.

## 2019-10-17 NOTE — PROGRESS NOTES
Nutrition: 
Nutrition Consult for TPN Management. (Dr. Marilou Barron) Assessment: Anthropometrics: Ht - 5'3\", wgt - 88 kg (10/15/19), BMI 34.4 c/w obesity, edema - anasarca. Revised Macronutrient needs: (IBW/52.2kg) HEF:  1499-6541 PPJI /day (25-30 kcal/kg IBW) EPR:  70-88 grams protein/day (0.8-1 grams/kg ABW) Max CHO:  300 grams/day (4 mg/kgIBW/min/day) Fluid:  1ml/kcal 
Intake/Comparative Standards: 
 Current intake of dextrose-based IVF contributes 204 calories/day. Diprivan, at its current infusion rate, contributes an additional 69 calories/day. Pertinent Labs: 
 Sodium 147, AM glucose 211, , creatinine 2.25, phosphorus 4.8, magnesium 2.5; POC glucose (10/16) [phone] and (10/17) 166,161. Pertinent Medications/Drips: 
 Lantus, SSI coverage, Solumedrol, Pepcid; drips: Flolan, Tracrium, Fentanyl, Diprivan. GI Function/Activity: 
 Obese abdomen, hypoactive bowel sound. Last bowel movement was yesterday. Currently on Tracrium. Diet: 
 NPO. IV Access: 
 Right IJ QLC. Food/Nutrition History: 
 69year old lady with a h/o MS, scleroderma and diabetes admitted from [facility] with ARDS. She is currently intubated, ventilated, sedated, paralyzed and NPO. Diagnosis (Nutrition): Altered GI function related to the need for the patient to be chemically paralyzed as evidenced by expected decreased GI activity and the need for TPN as nutrition support. Intervention: 
Meals and Snacks: NPO. Parenteral Nutrition/IV Fluid: 1) Start TPN with 1.68 liters (70 ml/hr) of 4.25%AA/10%DEX - 857 calories/day (66% calorie goal), 71 grams protein/day (101% protein goal), 168 grams CHO/day (does not exceed max CHO limit) and 1680 ml water/day (100% fluid goal). 2) Additives/liter: 20 potassium (acetate), 10 insulin. 3) MTE, MVI and Pepcid daily. Mineral Supplement Therapy: Nutrition Support Orders for Electrolyte Management Replacement are activated and placed on the MAR. Coordination of Nutrition Care by a Nutrition Professional: AM ICU rounds, collaboration with Dr. Yves Oliveira. Discharge Nutrition Plan: Too soon to determine. Brianda Olmos. Brian Freitas 
[phone]

## 2019-10-17 NOTE — PROGRESS NOTES
Critical Care Daily Progress Note: 10/17/2019 Admission Date: 10/14/2019 The patient's chart is reviewed and the patient is discussed with the staff. 
 
 
69 y. o. female being transferred to the ICU from the [facility]). She was transferred there on Saturday but experienced need for increased oxygen support during transport (85% up to 100%). She had been hospitalized at [facility] on Sept 23rd with confusion. She was felt to be septic and had an elevated WBC and lactic acid level. She was hypoxic and her CXR showed bilateral infiltrates.  She was treated for pneumonia (? Bacterial versus viral versus aspiration). The viral panel was positive for rhinovirus. Her CXR worsened on 9/26 and she required intubation on 9/27.  She required paralysis for ventilation due to respiratory distress. The pulmonary infiltrates progressed and she was felt to have ARDS. She was started on lasix for suspected volume overload. Echo shows an EF 55 to 60 mmHg. She has MAE and is being followed by nephrology.       
  
  She gradually improved so was extubated on 10/3. She was noted to be confused and profoundly weak so was seen in consultation by neurology. 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 Nerve conduction studies and EMG were completed which revealed primary axonal loss as opposed to demyelinating syndrome more consistent with CIP as opposed to Cloyde Ingrid has received 5 doses of IVIG. She had a brain MRI on Oct 4 which showed multiple, probable, MS lesions in the cerebrum and c spine (stable from previous examinations).   
  
   She developed acute respiratory failure on 10/8 requiring reintubation. She underwent trach and PEG placement on 10/10/19.  She has been tube feeding dependent but has not only had diarrhea but high gastric residuals.  The TF is currently on hold.  
  
   She underwent therapeutic bronchoscopy on 10/8 and 10/11 for mucus plugging. Bronchial washings reported enterococcus. She was restarted on antibiotics at the time of transfer to [facility] and recultured. All cultures from there are negative so far. She remains febrile and has a leukocytosis. Procalcitonin is 14.0.  She is requiring high amount of peep and FIO2 for oxygenation. She remains in respiratory distress. She has been sedated with Fentanyl. She developed rapid a fib after transfer so is now on a cardizem infusion and cardiology is following.  
  
   Her PMH is significant for multiple sclerosis and scleroderma. She is followed by Rheumatogy and treated with Plaquenil and 5 mg of Prednisone per day. Octavio Estrella is monitored for pulmonary hypertension and RVSP in the past has measured 40 mmHg but more recent echo showed a RVSP of 32 mmHg. She also has SADIE with home CPAP that was started last year.  
  
    She was diagnosed with a pulmonary embolism in June of 2019. She was on Eliquis as an outpatient but is currently on Lovenox Attempted to prone 10/15/19, after a few hours we could not ventilate pt. -could only get TV of  100ml , vent changed out, cxr showed no ptx, she  Was able to be bagged easily, eventually it was determined the problem was clogging of filters due to flolan, Subjective: No events overnight. Current Facility-Administered Medications Medication Dose Route Frequency  NUTRITIONAL SUPPORT ELECTROLYTE PRN ORDERS   Does Not Apply PRN  
 dextrose 5% infusion  50 mL/hr IntraVENous CONTINUOUS  
 sodium chloride (NS) flush 5-40 mL  5-40 mL IntraVENous Q8H  
 sodium chloride (NS) flush 5-40 mL  5-40 mL IntraVENous PRN  
 atracurium (TRACRIUM) 1,000 mg in 0.9% sodium chloride 250 mL infusion  0-20 mcg/kg/min IntraVENous TITRATE  dilTIAZem (CARDIZEM) 100 mg in 0.9% sodium chloride (MBP/ADV) 100 mL infusion  0-15 mg/hr IntraVENous TITRATE  carboxymethylcellulose sodium (REFRESH LIQUIGEL) 1 % ophthalmic solution 1 Drop  1 Drop Both Eyes Q4H  
  acetaminophen (TYLENOL) tablet 650 mg  650 mg Per G Tube Q6H PRN  
 amantadine HCl (SYMMETREL) capsule 100 mg  100 mg Per G Tube BID  chlorhexidine (PERIDEX) 0.12 % mouthwash 15 mL  15 mL Swish and Spit Q12H  
 enoxaparin (LOVENOX) injection 90 mg  90 mg SubCUTAneous Q24H  
 glatiramer (COPAXONE) injection 40 mg (Patient Supplied)  40 mg SubCUTAneous Q MON, WED & FRI  hydroxychloroquine (PLAQUENIL) tablet 200 mg  200 mg Per G Tube DAILY  insulin glargine (LANTUS) injection 15 Units  15 Units SubCUTAneous DAILY  methylPREDNISolone (PF) (SOLU-MEDROL) injection 40 mg  40 mg IntraVENous Q8H  
 PARoxetine (PAXIL) tablet 20 mg  20 mg Per G Tube DAILY  insulin lispro (HUMALOG) injection   SubCUTAneous Q6H  
 famotidine (PF) (PEPCID) 20 mg in sodium chloride 0.9% 10 mL injection  20 mg IntraVENous Q24H  cefepime (MAXIPIME) 2 g in 0.9% sodium chloride (MBP/ADV) 100 mL  2 g IntraVENous Q12H  
 metroNIDAZOLE (FLAGYL) IVPB premix 500 mg  500 mg IntraVENous Q12H  
 fentaNYL in normal saline (pf) 25 mcg/mL infusion  0-200 mcg/hr IntraVENous TITRATE  propofol (DIPRIVAN) infusion  0-50 mcg/kg/min IntraVENous TITRATE  epoprostenol (FLOLAN) 30,000 ng/mL in diluent for epoprostenol (gly) 50 mL solution  10-50 ng/kg/min (Ideal) Nebulization TITRATE  
 0.9% Sodium Chloride for Flolan Infusion  8 mL/hr Nebulization TITRATE Review of Systems Unobtainable due to patient status. Objective:  
 
Vitals:  
 10/17/19 5328 10/17/19 0543 10/17/19 0277 10/17/19 1635 BP: 153/67 152/66 149/68 148/67 Pulse: 71 71 71 71 Resp: 30 30 30 30 Temp:      
SpO2: 90% 91% 91% 91% Weight:      
Height:      
 
 
 
Intake/Output Summary (Last 24 hours) at 10/17/2019 8472 Last data filed at 10/17/2019 7351 Gross per 24 hour Intake 2345.29 ml Output 1250 ml Net 1095.29 ml  
 
Ventilator Settings Mode FIO2 Rate Tidal Volume Pressure PEEP Pressure control  75 %    374 ml     10 cm H20   
 
 Peak airway pressure: 39 cm H2O Minute ventilation: 8.4 l/min Physical Exam:         
Constitutional:  Trached and  mechanically ventilated. EENMT:  Sclera clear, pupils equal, oral mucosa moist 
Respiratory: crackles bilateral 
Cardiovascular:  RRR with no M,G,R; 
Gastrointestinal:  soft with no tenderness; positive bowel sounds present Musculoskeletal:  warm with no cyanosis, no lower extremity edema Skin:  no jaundice or ecchymosis Neurologic: no gross neuro deficits Psychiatric:  Sedated and paralyzed LINES:   
ETT, central line DRIPS:   
Propofol 5mcg/kg/min, fentanyl 75 mcg/hr, tracrium Flolan drip- inhalational 
 
CXR:   
 
 
 
Persistent bilateral infiltrates. ABG:  
Recent Labs 10/17/19 
0505 10/17/19 
0327 10/16/19 
1365 PHI 7.205* 7.190* 7.276* PCO2I 62.9* 60.3* 50.0*  
PO2I 60* 62* 97 HCO3I 24.9 23.1 23.3 LAB Recent Labs 10/17/19 
0516 10/16/19 
2316 10/16/19 
1805 10/16/19 
1248 10/16/19 
1003 GLUCPOC 161* 166* 181* 207* 174* Recent Labs 10/17/19 
5457 10/16/19 
0319 10/15/19 
5126 10/14/19 
1759 WBC 14.5* 14.7* 12.1*  --   
HGB 7.6* 7.6* 7.7*  --   
HCT 25.9* 25.5* 26.6*  --   
 180 222  --   
INR  --   --   --  1.4 Recent Labs 10/17/19 
2790 10/16/19 
0319 10/15/19 
1423 10/15/19 
7397 * 149* 152* 152* K 4.0 3.5  --  3.5 * 114*  --  115* CO2 24 26  --  27  
* 187*  --  219* * 137*  --  116* CREA 2.25* 2.02*  --  1.76* CA 8.0* 8.0*  --  7.9* ALB  --  1.6*  --  1.6* SGOT  --  11*  --  9* No results for input(s): LCAD, LAC in the last 72 hours. Assessment:  (Medical Decision Making) Patient Active Problem List  
Diagnosis Code  Acute respiratory failure with hypoxia (HCC) J96.01  
 Multiple sclerosis ([facility] G35  
 Critical illness polyneuropathy (HCC) G62.81  New onset atrial fibrillation (HCC) I48.91  
 Scleroderma ([facility] M34.9  Fever R50.9  Hypernatremia E87.0  Anemia D64.9  
 History of pulmonary embolism Z86.711  
 Diarrhea R19.7  Diabetes ([facility] E11.9  GERD (gastroesophageal reflux disease) K21.9  Anxiety F41.9  Depression F32.9  Hyperlipidemia E78.5  
 HTN (hypertension) I10  
 SADIE (obstructive sleep apnea) G47.33  
 MAE (acute kidney injury) ([facility] N17.9  Osteoarthritis M19.90  Pulmonary hypertension (HCC) I27.20  
 History of esophagitis Z87.19  Long term (current) use of systemic steroids Z79.52  
 Rhinovirus infection B34.8  ARDS (adult respiratory distress syndrome) ([facility] J80  Pneumonia due to infectious organism J18.9 Plan:  (Medical Decision Making) Hospital Problems  Never Reviewed Codes Class Noted POA * (Principal) Acute respiratory failure with hypoxia ([facility] ICD-10-CM: J96.01 
ICD-9-CM: 518.81  10/14/2019 Yes Critically ill on 75% fio2 and inhaled flolan P:F [de-identified] Bilateral infiltrates persist 
Continue to wean FiO2 Continue Flolan No proneing for now with current parameters- prone if oxigenation is worse. Multiple sclerosis ([facility] (Chronic) ICD-10-CM: G35 
ICD-9-CM: 340  10/14/2019 Yes Critical illness polyneuropathy ([facility] ICD-10-CM: S00.39 ICD-9-CM: 357.82  10/14/2019 Yes New onset atrial fibrillation [facility]) ICD-10-CM: I48.91 
ICD-9-CM: 427.31  10/14/2019 Yes Now back in SR Scleroderma (HCC) (Chronic) ICD-10-CM: M34.9 ICD-9-CM: 710.1  10/14/2019 Yes Fever ICD-10-CM: R50.9 ICD-9-CM: 780.60  10/14/2019 Yes Flagyl Cefepime ID following Hypernatremia ICD-10-CM: E87.0 ICD-9-CM: 276.0  10/14/2019 Yes  
 149>>147 Anemia ICD-10-CM: D64.9 ICD-9-CM: 285.9  10/14/2019 Yes History of pulmonary embolism ICD-10-CM: Z86.711 ICD-9-CM: V12.55  10/14/2019 Yes Overview Signed 10/14/2019  1:46 PM by Kathy Adams NP Dx June 2019 Diarrhea ICD-10-CM: R19.7 ICD-9-CM: 787.91  10/14/2019 Yes Diabetes ([facility] (Chronic) ICD-10-CM: E11.9 ICD-9-CM: 250.00  10/14/2019 Yes HTN (hypertension) (Chronic) ICD-10-CM: I10 
ICD-9-CM: 401.9  10/14/2019 Yes SADIE (obstructive sleep apnea) (Chronic) ICD-10-CM: G47.33 
ICD-9-CM: 327.23  10/14/2019 Yes Overview Addendum 10/14/2019  1:47 PM by Jacqueline Wilcox NP  
  PSG 2018 AHI 9.5/hour Home autopap min + 9 max, +15 MAE (acute kidney injury) ([facility] ICD-10-CM: N17.9 ICD-9-CM: 584.9  10/14/2019 Yes Stable cr 2.25 Pulmonary hypertension (HCC) (Chronic) ICD-10-CM: I27.20 ICD-9-CM: 416.8  10/14/2019 Yes Long term (current) use of systemic steroids (Chronic) ICD-10-CM: Z79.52 
ICD-9-CM: V58.65  10/14/2019 Yes Pneumonia due to rhinovirus- supportive care Critical care time 45 minutes Long discussion with sister and son regarding prognosis 30 min additionally spent. Discussed DNR status and comfort measures- they would like DNR status for now and will think on palliative deescalatioin and comfort care. More than 50% of the time documented was spent in face-to-face contact with the patient and in the care of the patient on the floor/unit where the patient is located.  
 
Anabela Lebron MD

## 2019-10-18 NOTE — PROGRESS NOTES
Chart reviewed and pt discussed in am IDR. Remains ICU critically ill. Family leaning to comfort care possibly Monday per staff. Had questions yesterday regarding cremation and  homes.  Gulshan Abebe, house supervisor, met with family to assist. CM following for any assist.

## 2019-10-18 NOTE — PROGRESS NOTES
Critical Care Daily Progress Note: 10/18/2019 Admission Date: 10/14/2019 The patient's chart is reviewed and the patient is discussed with the staff. 
 
 
69 y. o. female being transferred to the ICU from the [facility]). She was transferred there on Saturday but experienced need for increased oxygen support during transport (85% up to 100%). She had been hospitalized at [facility] on Sept 23rd with confusion. She was felt to be septic and had an elevated WBC and lactic acid level. She was hypoxic and her CXR showed bilateral infiltrates.  She was treated for pneumonia (? Bacterial versus viral versus aspiration). The viral panel was positive for rhinovirus. Her CXR worsened on 9/26 and she required intubation on 9/27.  She required paralysis for ventilation due to respiratory distress. The pulmonary infiltrates progressed and she was felt to have ARDS. She was started on lasix for suspected volume overload. Echo shows an EF 55 to 60 mmHg. She has MAE and is being followed by nephrology.       
  
  She gradually improved so was extubated on 10/3. She was noted to be confused and profoundly weak so was seen in consultation by neurology. 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 She had a brain MRI on Oct 4 which showed multiple, probable, MS lesions in the cerebrum and c spine (stable from previous examinations).   
  
   She developed acute respiratory failure on 10/8 requiring reintubation. She underwent trach and PEG placement on 10/10/19.  She has been tube feeding dependent but has not only had diarrhea but high gastric residuals.  The TF is currently on hold.  
  
   She underwent therapeutic bronchoscopy on 10/8 and 10/11 for mucus plugging. Bronchial washings reported enterococcus. She was restarted on antibiotics at the time of transfer to [facility] and recultured. All cultures from there are negative so far. She remains febrile and has a leukocytosis. Procalcitonin is 14.0.  She is requiring high amount of peep and FIO2 for oxygenation. She remains in respiratory distress. She has been sedated with Fentanyl. She developed rapid a fib after transfer so is now on a cardizem infusion and cardiology is following.  
  
   Her PMH is significant for multiple sclerosis and scleroderma. She is followed by Rheumatogy and treated with Plaquenil and 5 mg of Prednisone per day. Karin Stanley is monitored for pulmonary hypertension and RVSP in the past has measured 40 mmHg but more recent echo showed a RVSP of 32 mmHg. She also has SADIE with home CPAP that was started last year.  
  
    She was diagnosed with a pulmonary embolism in June of 2019. She was on Eliquis as an outpatient but is currently on Lovenox Attempted to prone 10/15/19, after a few hours we could not ventilate pt. -could only get TV of  100ml , vent changed out, cxr showed no ptx, she  Was able to be bagged easily, eventually it was determined the problem was clogging of filters due to flolan, Subjective: No events overnight. Current Facility-Administered Medications Medication Dose Route Frequency  0.9% sodium chloride infusion 250 mL  250 mL IntraVENous PRN  
 TPN ADULT - dextrose 10% amino acid 4.25%   IntraVENous QPM  
 NUTRITIONAL SUPPORT ELECTROLYTE PRN ORDERS   Does Not Apply PRN  
 sodium chloride (NS) flush 5-40 mL  5-40 mL IntraVENous Q8H  
 sodium chloride (NS) flush 5-40 mL  5-40 mL IntraVENous PRN  
 atracurium (TRACRIUM) 1,000 mg in 0.9% sodium chloride 250 mL infusion  0-20 mcg/kg/min IntraVENous TITRATE  dilTIAZem (CARDIZEM) 100 mg in 0.9% sodium chloride (MBP/ADV) 100 mL infusion  0-15 mg/hr IntraVENous TITRATE  carboxymethylcellulose sodium (REFRESH LIQUIGEL) 1 % ophthalmic solution 1 Drop  1 Drop Both Eyes Q4H  
 acetaminophen (TYLENOL) tablet 650 mg  650 mg Per G Tube Q6H PRN  
 amantadine HCl (SYMMETREL) capsule 100 mg  100 mg Per G Tube BID  chlorhexidine (PERIDEX) 0.12 % mouthwash 15 mL  15 mL Swish and Spit Q12H  
 enoxaparin (LOVENOX) injection 90 mg  90 mg SubCUTAneous Q24H  
 glatiramer (COPAXONE) injection 40 mg (Patient Supplied)  40 mg SubCUTAneous Q MON, WED & FRI  hydroxychloroquine (PLAQUENIL) tablet 200 mg  200 mg Per G Tube DAILY  insulin glargine (LANTUS) injection 15 Units  15 Units SubCUTAneous DAILY  methylPREDNISolone (PF) (SOLU-MEDROL) injection 40 mg  40 mg IntraVENous Q8H  
 PARoxetine (PAXIL) tablet 20 mg  20 mg Per G Tube DAILY  insulin lispro (HUMALOG) injection   SubCUTAneous Q6H  
 fentaNYL in normal saline (pf) 25 mcg/mL infusion  0-200 mcg/hr IntraVENous TITRATE  propofol (DIPRIVAN) infusion  0-50 mcg/kg/min IntraVENous TITRATE  epoprostenol (FLOLAN) 30,000 ng/mL in diluent for epoprostenol (gly) 50 mL solution  10-50 ng/kg/min (Ideal) Nebulization TITRATE  
 0.9% Sodium Chloride for Flolan Infusion  8 mL/hr Nebulization TITRATE Review of Systems Unobtainable due to patient status. Objective:  
 
Vitals:  
 10/18/19 2912 10/18/19 8144 10/18/19 0358 10/18/19 0413 BP:  97/49 120/58 126/58 Pulse: 62 62 63 63 Resp: 30 30 30 30 Temp:      
SpO2: 96% 96% 96% 95% Weight:      
Height:      
 
 
 
Intake/Output Summary (Last 24 hours) at 10/18/2019 5920 Last data filed at 10/17/2019 1736 Gross per 24 hour Intake 1094.86 ml Output 600 ml Net 494.86 ml Ventilator Settings Mode FIO2 Rate Tidal Volume Pressure PEEP Pressure control  85 %    267 ml     10 cm H20 Peak airway pressure: 38 cm H2O Minute ventilation: 6.5 l/min Physical Exam:         
Constitutional:  Trached and  mechanically ventilated. EENMT:  Sclera clear, pupils equal, oral mucosa moist 
Respiratory: crackles bilateral 
Cardiovascular:  RRR with no M,G,R; 
Gastrointestinal:  soft with no tenderness; positive bowel sounds present Musculoskeletal:  warm with no cyanosis, no lower extremity edema Skin:  no jaundice or ecchymosis Neurologic: no gross neuro deficits Psychiatric:  Sedated and paralyzed LINES:   
ETT, central line DRIPS:   
Propofol 5mcg/kg/min, fentanyl 75 mcg/hr, tracrium Flolan drip- inhalational 
 
CXR:   
 
 
 
 
 
Persistent bilateral infiltrates. ABG:  
Recent Labs 10/18/19 
8355 10/18/19 
9931 10/17/19 
0505 PHI 7.208* 7.106* 7.205* PCO2I 64.4* 82.8* 62.9*  
PO2I 68* 82 60* HCO3I 25.7 26.0 24.9 LAB Recent Labs 10/18/19 
4877 10/17/19 
2309 10/17/19 
1730 10/17/19 
1228 10/17/19 
8104 GLUCPOC 314* 247* 123* 164* 161* Recent Labs 10/18/19 
0340 10/17/19 
0324 10/16/19 
0319 WBC 15.6* 14.5* 14.7* HGB 6.5* 7.6* 7.6* HCT 22.5* 25.9* 25.5*  
 241 180 Recent Labs 10/18/19 
4936 10/17/19 
0327 10/17/19 
1283 10/16/19 
0193 *  --  147* 149*  
K 4.2  --  4.0 3.5 *  --  113* 114* CO2 28  --  24 26 *  --  211* 187* *  --  143* 137* CREA 2.42*  --  2.25* 2.02* MG 2.3 2.5*  --   --   
PHOS 5.1* 4.8*  --   --   
CA 6.6*  --  8.0* 8.0* ALB  --   --   --  1.6* SGOT  --   --   --  11* No results for input(s): LCAD, LAC in the last 72 hours. Assessment:  (Medical Decision Making) Patient Active Problem List  
Diagnosis Code  Acute respiratory failure with hypoxia (HCC) J96.01  
 Multiple sclerosis ([facility] [address] G35  
 Critical illness polyneuropathy (HCC) G62.81  New onset atrial fibrillation (HCC) I48.91  
 Scleroderma ([facility] M34.9  Fever R50.9  Hypernatremia E87.0  Anemia D64.9  
 History of pulmonary embolism Z86.711  
 Diarrhea R19.7  Diabetes ([facility] E11.9  GERD (gastroesophageal reflux disease) K21.9  Anxiety F41.9  Depression F32.9  Hyperlipidemia E78.5  
 HTN (hypertension) I10  
 SADIE (obstructive sleep apnea) G47.33  
 MAE (acute kidney injury) ([facility] N17.9  Osteoarthritis M19.90  Pulmonary hypertension (HCC) I27.20  
 History of esophagitis Z87.19  Long term (current) use of systemic steroids Z79.52  
 Rhinovirus infection B34.8  ARDS (adult respiratory distress syndrome) ([facility] J80  Pneumonia due to infectious organism J18.9 Plan:  (Medical Decision Making) Hospital Problems  Never Reviewed Codes Class Noted POA * (Principal) Acute respiratory failure with hypoxia ([facility] ICD-10-CM: J96.01 
ICD-9-CM: 518.81  10/14/2019 Yes Critically ill on 85% fio2 and inhaled flolan P:F [de-identified] Bilateral infiltrates persist 
Continue to wean FiO2 Continue Flolan No proneing for now with current parameters- prone if oxigenation is worse. Change vent settings to PRVC rr 25//PEPP 10 ABG at 8AM  
 Multiple sclerosis ([facility] (Chronic) ICD-10-CM: G35 
ICD-9-CM: 340  10/14/2019 Yes Critical illness polyneuropathy ([facility] ICD-10-CM: L20.20 ICD-9-CM: 357.82  10/14/2019 Yes New onset atrial fibrillation [facility]) ICD-10-CM: I48.91 
ICD-9-CM: 427.31  10/14/2019 Yes Now back in SR Scleroderma (HCC) (Chronic) ICD-10-CM: M34.9 ICD-9-CM: 710.1  10/14/2019 Yes Fever ICD-10-CM: R50.9 ICD-9-CM: 780.60  10/14/2019 Yes Flagyl Cefepime ID following Hypernatremia ICD-10-CM: E87.0 ICD-9-CM: 276.0  10/14/2019 Yes  
 149>>147>>147- add D5w since PEG unusable(large residuals, billious vomitus) Anemia ICD-10-CM: D64.9 ICD-9-CM: 285.9  10/14/2019 Yes History of pulmonary embolism ICD-10-CM: Z86.711 ICD-9-CM: V12.55  10/14/2019 Yes Overview Signed 10/14/2019  1:46 PM by Boubacar Oh NP Dx June 2019 Diarrhea ICD-10-CM: R19.7 ICD-9-CM: 787.91  10/14/2019 Yes Diabetes ([facility] (Chronic) ICD-10-CM: E11.9 ICD-9-CM: 250.00  10/14/2019 Yes HTN (hypertension) (Chronic) ICD-10-CM: I10 
ICD-9-CM: 401.9  10/14/2019 Yes SADIE (obstructive sleep apnea) (Chronic) ICD-10-CM: G47.33 
ICD-9-CM: 327.23  10/14/2019 Yes Overview Addendum 10/14/2019  1:47 PM by Radha Jacinto NP  
  PSG 2018 AHI 9.5/hour Home autopap min + 9 max, +15 MAE (acute kidney injury) ([facility] ICD-10-CM: N17.9 ICD-9-CM: 584.9  10/14/2019 Yes Stable cr 2.25 Pulmonary hypertension (HCC) (Chronic) ICD-10-CM: I27.20 ICD-9-CM: 416.8  10/14/2019 Yes Long term (current) use of systemic steroids (Chronic) ICD-10-CM: Z79.52 
ICD-9-CM: V58.65  10/14/2019 Yes Pneumonia due to rhinovirus- supportive care Critical care time 30 minutes Long discussion with sister and son regarding prognosis 10/17/19 Discussed DNR status and comfort measures- they would like DNR status for now and will think on palliative deescalatioin and comfort care- likely Monday. More than 50% of the time documented was spent in face-to-face contact with the patient and in the care of the patient on the floor/unit where the patient is located.  
 
Fan Pierre MD

## 2019-10-18 NOTE — ROUTINE PROCESS
Guideline Guideline Number: [id number] Title: Management of the Patient with Mechanical Ventilation (including weaning) and ABCDE Bundle Effective Date:  03/00 Revised Date: 02/09, 03/10, 7/12, 5/13,                                  10/13, 8/14 Reviewed Date: 07/2015, 04/2016, 06/2017 I. Policy:  Management of the patient requiring mechanical ventilation, including readiness to wean and weaning protocol. The information provided serves as a guideline for patient management. Included in this guidelines is the ABCDE Bundle to provide guidance to staff for evidence based management of pain, agitation/anxiety and delirium in the intensive care unit. The goals of critical care analgesia and sedation are to facilitate mechanical ventilation, to prevent patient and caregiver injury, and to avoid the psychological and physiologic consequences of inadequate treatment of pain, anxiety, agitation, and delirium by maintaining a light level of sedation. Pain occurs commonly in adult ICU patients, regardless of admitting diagnosis. Therefore, pain should be frequently assessed and analgesic medications titrated to prevent adverse effects associated with either inadequate or excessive analgesia. Once pain has been addressed, anxiolytic and/or antipsychotic medications can be utilized to treat unresolved agitation/anxiety and delirium, with the goal to prevent over- or under sedation by using the Becerril Agitation Sedation Scale (RASS). Assertive management of these issues has been shown to reduce costs, improve ICU outcomes such as successful extubation and ICU length of stay, and allow for patients to participate in their own care. II. Purpose: The respiratory care practitioner and the critical care RN will utilize the following guideline to provide the most efficient and effective management of mechanical ventilators and weaning and extubation processes. Goals of Treatment: 1. Adequate management of patients pain and discomfort while maintaining a light level of                   sedation (RASS score of 0 to -1) 2. Both chronic and acute sources of pain should be identified and treated. 3. Sedative agents should be considered if patient still expresses discomfort and/or is not at RASS         goal of 0 to -1 despite adequate management of pain. 4. Patients requiring neuromuscular blockage must have continuous infusions of analgesic and              sedative agents. III. Responsibility: Director Respiratory Care Services and all Respiratory Care Practitioners  with documented competency as well as Critical Care RN staff. General Guidelines 1. Introduction to Ventilator Plan Phase 
a. Ventilator Management Phase, General Statement -  The plan should be initiated in patients who have a secure airway/require invasive mechanical ventilation (endotracheal or tracheostomy) only -   The provider determines the appropriate medications used for analgesia and agitation/anxiety along with the clinical pharmacist 
 
2. Monitoring Levels of Comfort 
a. Pain Assessment 
- Pain is monitored using the numerical scales - A pain assessment should be conducted, at a minimum, every 4 hours and as needed and per guidelines. - The level of pain should be determined as satisfactory by the patient based on patients baseline level of pain , considering any chronic pain that the patient may have. - If the patient is unable to communicate pain level, the nurse can assess for nonverbal indicators including facial grimacing, moaning, tachypnea, tachycardia, hypertension, diaphoresis, etc as a cue to begin further pain assessment. b.  Sedation Assessment - Sedation is monitored using the Becerril Agitation Sedation Scale (RASS) Target RASS RASS Description +4 Combative, violent, danger to staff 
  
+3 Pulls or removes tubes(s) or catheters; aggressive +2 Frequent nonpurposeful movement, fights ventilator +1 Anxious, apprehensive, but not aggressive  
0 Alert and calm  
-1 Awakens to voice (eye opening/contact) > 10 sec  
-2 Light sedation, briefly awakens to voice (eye opening/contact) < 10 sec  
-3 Moderate sedation, movement or eye opening. No eye contact  
-4 Deep sedation, no response to voice, but movement or eye opening to physical stimulation  
-5 Unarousable, no response to voice or physical stimulation  
 
-  Goal RASS is 0 to -1, unless otherwise specified by providers order. 
- Nursing staff should conduct the RASS every 4 hours and as needed to maintain goal RASS of 0 to -1. 
- If RASS is outside of goal range, discuss treatment options with provider. 
- A RASS score of +2 to +4 requires further assessment by the nurse. Causes of agitation/anxiety that should be considered include: 
a. Pulmonary -   endotracheal tube malposition or patency, mode of ventilation, pneumothorax, hypoxemia, hypercarbia 
b. Metabolic  hypoglycemia, hyponatremia, acute renal or hepatic failure 
c. Emotional upset  with information and awareness of critical condition, prognosis, need for surgical or invasive procedures, other interventions or complications, family or personal stressors 
- C. Sedation Assessment while using Neuromusclar Blocking Agents 
- D. Delirium Assessment 
a. the ICU ([facility]  ICU) 3. Analgesia The incidence of significant pain has been reported to be 50% or higher in both medical and surgical ICU patients. These patients also experience discomfort during routine/procedural ICU care and at rest.  However, patients may be unable to self-report their pain (either verbally or with other signs) because of an altered level of consciousness, the use of mechanical ventilation, or high doses of sedative agents or neuromuscular blocking agents.  
The short and long term consequences of unrelieved or inadequately treated pain are significant and include patient discomfort, decreased satisfaction with care by family and patient, delirium, agitation/anxiety, post traumatic stress disorder and depression. Therefore, routine assessment and treatment of pain should occur in all ICU patients. Causes and Treatment of Pain in the ICU 
a. Acute pain (post-operative, procedural pain, discomfort with usual ICU care or other acute episodes of pain-related to underlying disease) 1. Consider use of PCA for alert and oriented patients with pain needs not met by PRN dosing or opoids. 2. Preemptive analgesia should occur prior to chest tube removal, and should be considered for other procedural pain such as turning and repositioning, would drain removal, wound dressing change, tracheal suctioning, femoral catheter removal or place of central venous catheter. 3. Appropriate analgesic medications for preemptive analgesia are short acting intravenous (IV) agents (i.e. fentanyl, morphine, hydromorphone) a. Administration of analgesia before patient experiences noxious stimuli prevents amplification and hyperexcitability of the central nervous system. b. Analgesia for Mechanically Ventilated Patients: 
1. The approach to sedation and analgesia management for mechanically ventilated patients favors use of analgesia first sedation. The primary goal of this strategy is to address pain and discomfort first, and then if necessary, add anxiolytic agent. 2. Analgesia first sedation reduces dose escalation of medications, decreases the duration of mechanical ventilation and the incidence of VAP, improves the probability of successful extubation, and ultimately shortens ICU length of stay. 3. For pain management, analgesic medications are determined by the provider.    Intermittent dosing of the analgesic should be attempted first.   
If the patient requires more than 3 doses within 1 hour then provider should be contacted to consider continuous infusion. 4.  Analgesic options for mechanically ventilated patients include: 
a. Fentanyl which is considered the drug of choice for patients requiring continuous infusion. b.Morphine may be considered for those patients without renal dysfunction who are hemodynamically stable and require intermittent pain medication. Continuous infusions of morphine may be used for patientl who are receiving comfort care as part of end of life care. c.Hydromorphone is reserved for patients who are refractory to fentanyl or morphine and is typically admininstered by intermittent dosing. 4.  Agitation/Anxiety Background Agitation and anxiety frequently occur in ICU patients. Anxiolytic/sedation agents may be indicated to help relieve discomfort, improve synchrony with mechanical ventilation, and decrease the overall work of breathing. Pain control alone may be sufficient to make patients comfortable enough to require no anxiolytic/sedative agent. In addition, non-pharmacologic interventions such as repositioning or verbal assurance may be helpful to comfort or redirect an agitated patient. If these methods are unsuccessful, then anxiolytic/sedative medications such as propofol, dexmedetomidine, or benzodiazepines can be used. Selection of an anxiolytic should be based on the pharmacokinetic properties of the medication, patient specific characteristics, and sedation goal.   However, nonbenzodiazepine sedatives (ie propofol or dexmedetomidine) may be preferable over benzodiazepines (ie midazolam or larazepam) due to more favorable outcomes such as delirum. Causes and Treatment of Agitation/Anxiety 
a. Possible underlying causes of agitation and anxiety include pain, delirium, hypoxemia, hypoglycemia, hypotension, or withdrawal from alcohol  and other drugs.  
b. Analgesia first sedation should be attempted initially to manage pain and provide sedation in appropriate patients. Analgesia alone may be adequate to reach RASS goal of 0 to -1. If patient remains agitated or anxious despite adequate analgesia (ie RASS +2 to +4) then anxiolytic/sedative should be considered. c. The choice of anxiolytic should be based on desired levels of sedation (ie light sedation or deep sedation) with preference for the use of nonbenzodiazepines such as propofol or dexmedetomidine if appropriate. While light sedation (ie RASS 0 to -1) is preferred for most patients, there are instances when deep sedation (ie RASS -4 to -5) is desired. For example, in the setting of ventilator dysynchrony due to ARDS or for patients receiving NMB agents. d. Medications to maintain light sedation (ie RASS 0 to -1) include 1. Propofol continuous infusion can be considered for hemodynamically stable (ie SBP = 100, MAP = 65 and/or not requiring vasopressor support) patients requiring light sedation. Propofol has a quick onset (1-2 minutes) and offset of action, making it a good agent to assess neurological status and facilitate liberation from the mechanical ventilator. 2.Dexmedetomidine continuous infusion is a good option for hemodynamically stable patients requiring light sedation as it allows for a more awake, interactive patient is associated with less delirium. It has an intermediate onset of action (5-10 min). Therefore, abrupt titrations should be avoided, but use of prn haloperidol or benzodiazepine may be useful to manage agitation until the medication takes effect. 3. Antipsychotics are another option. In particular, haloperidol intermittently dosed may be useful for patients with symptoms of agitation/anxiety and delirium. 4.Benzodiazapines can also be considered for light sedation, but should be intermittently doses. Midazolam is an option for patients without renal dysfunction.   It has a short onset of action (2-5 minutes) making it a good agent for acute agitation/anxiety, but short duration of action resulting in frequent dosing. Lorazepam is another option. It has a longer onset of action (15-20 minutes) in comparison to midazolam, but longer duration of action. e. Medications to maintain deep sedation (RASS -4 to -5) include: 
1) Propofol continuous infusion should be considered as a first line option for hemodynamically stable patients. 2)Benzodiazepines can be considered as second line options for deep sedation. Studies comparing these agents to other sedatives have shown that they lead to worse outcomes including delirium, oversedation, delayed extubation, and longer time to discharge. Midazolam is one option for patients without renal dysfunction and lorazepam is another options. If patient requires more than 3 doses within 1 hour then contact provider  to consider initiation of continuous infusion. 5.  Daily Sedation Awakening Trial (SAT) from IV Continuous Analgesia/Sedation 
a. Patients are to have daily awakening from sedation while on continuous IV analgesia and/or sedation in the ICU. Continuous analgesia infusions may be maintained only if needed for active pain and RASS is at goal 0 - -1. Unit guideline is for the SAT to occur following ICP rounds each morning.   
b. The sedation awakening trial (SAT) is done regardless if the patient meets criteria for spontaneous breathing trial (SBT). c. SAT safety screen is assessed and SAT should not be performed if sedation is being used for active seizures, alcohol withdrawal, hemodynamically unstable or requiring support of vasoactive medications , in conjunction with NMB agents, if ICP is greater than 
20mmHg or if sedation is being used to control ICP, patients RASS is +3 or +4 (very agitated or combative).   Other exclusion criteria are:  if there is documentation of myocardial ischemia in the past 24 hours; or patient is receiving high frequency oscillator ventilation (HFOV) , if the patient has an open chest /abdomen or is receiving comfort care. d. Criteria for passing the SAT are the patient opened their eyes to verbal stimuli or tolerated sedative interruption without exhibiting failure criteria. 
e. Patients fail the SAT if the develop sustained anxiety, agitation, or pain; a respiratory rate of 35 per minutes for 5 minutes or longer, an SpO2 less than 88% for 5 minutes or longer; an acute cardiac dysrhythmia; two or more signs of respiratory distress including tachycardia, bradycardia; use of accessory muscles; diaphoresis; marked dyspnea; or myocardial ischemia. f. Respiratory therapy staff must verify with the nurse that continuous IV analgesia (unless being use  for active pain) and sedation (unless patient is receiving dexmedetomidine) is off prior to placing patient on SBT. g. DO NOT interrupt infusion of analgesia and sedation medications if patient is receiving neuromuscular blockade. 
h. Monitor level of wakefulness unless patient is awake and follows commands (RASS 0 to -1) or patient becomes uncomfortable or agitated (RASS +3 to +4) 
i. If agitation prevents successful awakening , administer bolus of analgesia and/or sedation then resume infusion of the medication at ½ previous dose and titrate as needed. 
j. If oversedation prevents successful awakening, hole infusion until at goal and resume ½ of prior infusion rate/dose if clinically indicated. 6. Delirium Background Delirium is characterized by the acute onset of cerebral dysfunction with a change or fluctuation baseline 
mental status, inattention, and either disorganized thinking or an altered level of consciousness. It affects up to 80% of mechanically ventilated adult ICU patients, and is associated with increased mortality,  
and treatment is important and may in turn allow for a patient to be conscious yet cooperative enough to participate in ventilator weaning trials and early mobilization efforts. Delirium can only be assessed in patients who are able to sufficiently interact and communicate with bedside 
clinicians (ie RASS -3 to +4). IV. Procedure: A. Assessment: The following criteria must be assessed prior to the initiation of weaning from mechanical ventilation. Note: The criteria are general guidelines and must be individualized for each patient. The patients primary nurse will be responsible, in coordination with the RT, the Spontaneous Awakening Trial). The RT will perform the SBT. B. Spontaneous Awakening Trials (SATs  also referred to as Sedation Vacation) and Spontaneous Breathing Trials (SBTs) performed to determine readiness to wean. 1. For patients who meet established criteria, such as those without active seizures, alcohol withdrawal and agitation, myocardial ischemia or those requiring cardiac support devices, without increased intracranial pressure and those not receiving neuromuscular blockade, the nurse will reduce the infusions of sedative by 50% of current used for sedation that was used to achieve a level of light sedation (Torres Score 2 or RASS score of 0 to -1) and evaluate patient response to reduction of sedation. Analgesics required for pain control are continued during the test.  Obtain MD order to cover no SAT for that time period if patient has any exclusion criteria as described above. 2. Failure of the spontaneous awakening trial occurs when the patient shows symptoms such as increased agitation, anxiety, pain or signs of respiratory distress including respiratory rate >35/min or oxygen saturation <88% as well as development of acute cardiac arrhythmias. If these symptoms develop during the SAT, the nurse then restarts sedation at 75% of the previous dose and titrates the medications until the patient is comfortable and/or symptoms have abated. 3.  If the patient passes the SAT then the patient moves on to the Spontaneous Breathing Trials as performed by the RT. The SBT Safety Screen included the following:  No agitation, oxygen saturation > 88%, FIO2 < 50%, PEEP < 7.5 cm H20, no myocardial ischemia, no vasopressor use, and with inspiratory efforts. 4. Patients who pass the spontaneous awakening trial but fail the spontaneous breathing trial are placed back on full ventilator support and reassessed the next day. 5. Failure of the SBT (spontaneous breathing trail) includes any of the following:  Respiratory rate > 35/min, respiratory rate < 8/min, oxygen saturation < 88%, respiratory distress, mental status change, acute cardiac arrhythmia. 6. Extubation is considered for patients who tolerate the spontaneous awakening trial and pass the spontaneous breathing trial.   
C. Can the cause of respiratory failure be reversed (i.e. absence of high spinal cord injury or advanced ALS)? D. Is gas exchange adequate? 1. PaO2/FIO2 ratio > 150  200, 2. PEEP < 8 cm H20 
3. FIO2 < 50 
4. pH > 7.30 
5. Rapid shallow breathing index (f/VT) < 105 
E. Is patient hemodynamically stable? 1. Absence of clinically significant hypotension (minimal vasopressors such as Dopamine < 5mcg/kg/minute)? F. Is there evidence of intact respiratory drive (NIP/NIF >-15 ITZ90, stable VC02)? G. Does patient have an adequate cough, airway clearance ability? H. Is there absence of excessive secretions? V. Initiation: A. The therapist shall consult with RN to determine if sedation can be discontinued or significantly decreased. If this can be achieved, the therapist shall implement the  
                  followin. Identify patient and verify name and account number via ID bracelet. 2. Perform hand hygiene per hospital policy utilizing Standard Precautions for all patients and following transmission-based isolation as indicated per hospital policy. 3. Perform a ONE-MINUTE SPONTANEOUS TRIAL AND ASSESSMENT. 4. Measure Rapid Shallow Breathing Index (RSBI) and monitor SpO2 and cardiovascular parameters during the spontaneous breathing assessment. 5. If SpO2 and cardiovascular parameters are stable, continue spontaneous breathing trial for at least 30 minutes and up to 120 minutes, as patient tolerates. 6. Monitor ventilatory status, SpO2, and cardiovascular status during spontaneous breathing trial. 
7. If patient has a successful trial, consider patient as a candidate for extubation and obtain order. 8. If patient fails the weaning trial, place back on ventilator and adjust settings to provide a non-fatiguing form of ventilatory support for the remainder of the day and night. 9. One attempt at weaning shall be performed each day until successful weaning occurs. The RCP will make every attempt to begin the spontaneous breathing trials between 0500 and 0600 to provide documentation of the trial when the pulmonologist makes rounds. B.  Assessment of SBT or PST: 
1. Is gas exchange acceptable? 2. PaO2 > 60 mm Hg. 3. PH > 7.30 
4. Increase in PaCO2  < 10 mm Hg C. Is patient hemodynamically stable? 1. HR < 120 beats/minute 2. HR  < 20% 3. Systolic BP < 182 and > 90 mmHg 4. BP  < 20%, no vasopressors required D. Does patient have stable ventilatory pattern? 1. Sustained RR < 30 breaths per minute 2. Normal and stable VCO2 3. Patient is not demonstrating any signs of increased work of breathing, such as increased use of accessory muscles. E. Mental status stable throughout trial? 
1. Absence of changes such as somnolence, excessive agitation or anxiety 2. Absence of diaphoresis during trial? 
IV. Safety: A. The RCP shall monitor patient according to the above guidelines.  If at any time during the weaning process, the respiratory therapist or nurse feels that the patient is not tolerating weaning, the therapist shall place patient back on previous ventilator settings. B. The patient shall be reassessed and the weaning process should be continued the following day. V. Reportable Conditions: A. The therapist shall notify the physician, as appropriate, for any of the following         conditions: 1. FIO2 increase (sustained) at 10% or greater 2. Poor patient/ventilator interface in spite of adjustments 3. Need for increased sedation for respiratory distress 4. Need for increasing ventilating pressures (i.e. PEEP, PIP, MAP) 5. ABG results meeting panic value criteria or other clinical signs indicating deterioration of patients condition. 6. Unplanned extubation. 7. Unexplained sustained increase in PIP greater than 10 cm H2O. 
8. Assessment results regarding ventilator discontinuance process. VI. Ventilator protocol management A. The following items should be maintained for patients who are being mechanically           ventilated. 1. Obtain STAT Chest X-Ray for ET tube placement after insertion. 2. Chest X-Ray q a.m. while on ventilator. 3. ABGs 30 - 60 minutes after being stable on the ventilator. 4. ABG's q a.m. while on ventilator and prn. 
5. Do spontaneous breathing trials when patient is hemodynamically stable, responsive, and without fever. 6. Terminate trials if patient exhibits signs of respiratory distress. 7. Therapists should maintain ABG s as follows: 
    a. pH -  7.30 - 7.50              
    b. PaO2 -   60  100  
     8. Racemic Epinephrine (0.5cc) for post extubation stridor (2 UA treatments max.) 
 
VII. Early Mobilization Mobility Level Criteria Start at Level 1 if:  
PaO2/FIO2 <250 Positive end-expiratory Pressure (PEEP) >=10 cm H2O  
O2 saturation <90% Respiratory Rate (RR) Not within 10-30 per min Cardiac arrhythmias or ischemia New onset Heart Rate  (HR) <60 or >120 beats per min Mean arterial pressure (MAP) <55 or >140 mmHg Systolic blood pressure (SBP) <90 or > 180 mmHg Vasopressor infusion New or increasing Becerril Agitation Scale (RASS) < - 3 Level I:   Breathe  (Rass -5 to -3) HOB Angle  improve VAP protocol compliance ? Visually confirm the [facility] is elevated >= 30 degrees to comply with VAP prevention protocols ? The Centers for Disease Control and Prevention recommends an HOB angle of 30-45 degrees , unless contraindicated Additional activities to be implemented ? Every 2 hour turning ? Passive range of motion ? Up to 20 degrees reverse trendelenburg with lower extremity exercise/retracting footboard ? Continuous lateral rotation therapy can be considered part of early mobility therapy in patients who are at high risk for pulmonary complications Move to Level 2 when the patient 
- Has acceptable oxygenation/hemodynamics - Tolerates q 2 turning - Tolerates HOB > 30 degrees or up to 20 degrees reverse trendelenburg Level 2 :Tilt  Patient Assessment Rass > -3  (eg, opens eyes, may have profound weakness) Up to 20 degrees Reverse Trendelenburg position and 10 degrees minimum HOB 
- Reverse Trendelenburg positioning allows for orthostatic position in fragile patients - If available , use in conjunction with retracting foot section to allow for partial weight bearing prior to sitting up in the bed or getting out of bed Additional activities to be implemented -  Maintain HOB >/= 30 degrees - Q 2 hour turning - Passive/active range of motion - Legs dependent - PT consultation Move to Level 3 when the patient . Jovitavenia Laina -Tolerates active- assistance exercises 2 times per day 
  -Tolerates lower extremity exercises against footboard/Up to 20 degrees Reverse Trendelenburg 
  -Tolerates legs dependent /HOB 45 degrees Level 3 :  SIT  (Rass >- 1 (eg , weak but may move arms/legs independently) Full chair position (footboard on) ?  Full upright positioning allows for diaphragmatic excursion and lung expansion ? Sitting with legs in a dependent position facilitates gas exchange Additional activities to be implemented - Maintain HOB >= 30 degrees - Q 2 hour turning (assisted) - Active range of motion  PT/OT actively involved - Encourage activities of daily living 
- Dangling, if patient can move arm against gravity Move to Level 4 when the patient . Cayetano Prince - Tolerates increasing active exercise in bed - Actively assists with every- 2- hour turning or turns independently - Tolerates full chair position 3 times/day Level 4:  Stand ( RASS >0 (eg, weak but may tolerate increased activity) Stand Attempts ? Full chair position (footboard off/feet on the floor) ? Consider using a sit-to-stand lift ? Pivot to chair, it tolerates partial weight bearing Additional activities to be implemented - Maintain head of bed >= 30 degrees - Q 2 hr turning (self/assisted) - Active range of motion - Encourage activities of daily living 
- PT/OT actively involved Move to Level 5 when the patient . 
- Can successfully comply with all activities - Tolerates trial periods of full chair position (footboard off/feet on floor) 3 times per day - Tolerates partial weight-bearing stand and pivots to chair Level 5 :  Move  (RASS > 0    (eg, weak but may tolerate increased activity) Achieve out of bed ? Utilize mobile floor life to ambulate to bedside chair Additional activities to be implemented - Maintain HOB > = 30 degrees - Q 2 hour turning (self/assisted) - Active range of motion - Patient stands/bears weight > 1 minute - Patient marches in place 
- PT/OT actively involved Patient continues to ambulate progressively longer distances as tolerated until they consistently participate and move independently. E Approved by Critical Care Committee 2-19-09 [facility] Clinical Guidelines ABCDE DOC Flowsheet Content Variables to select when addressing section Comments ABCDE Initiated ? Yes/No  RN to address minimum q 24 hours (day shift) Target RASS ? 0 = alert and oriented ? -1 = drowsy ? -2 = light sedation ? -3= moderate sedation ? -4= deep sedation Target on standard ventilator setting should be -2; -4 with oscillator CAM -ICU ? Positive ? Negative ? Unable to assess Delirium assessment SAT Safety Screen Passed ? Yes 
? No Select yes if proceeding on to the sedation vacation (reduction of continuous sedative drip by directed by MD) Select no if your patient has any of the below reasons for not proceeding on to the daily awakening sedation vacation trial  
SAT Screen for Failure ? Active seizures ? Acute delirium tremors ? Agitation that threatens accidental line/tube removal 
? On paralytics ? MI (24-48hr) ? Abnormal ICP ? Open abdomen Select one of the options when the patient will not undergo the sedation vacation  ALSO MUST OBTAIN AN ORDER FOR no Ria Austin written under nursing miscellaneous for now by either the NP or Intensivist  
Daily sedation Vacation/assessment of  ? Yes 
? No 
? Not applicable If yes, MUST see the reduction in sedation on the [facility] and please place in the comment section of the sedative sedation vacation started SBT Safety Screen Passed ? Yes 
? No Select Yes if the patient has none of the below listed reasons for not proceeding on to the SBT following reduction of sedation SBT Screen Reason for Failure ? Agitation ? O2 Sat < or = 88% 
? FIO2 > 50% ? PEEP >7 
? MI 
? Vasopressor Use 
? Bilevel setting on Vent ? Oscillator in use ? Increased resp effort Select reason as appropriate for NOT proceeding on to the SBT Wake Up and Breathe Protocol Respiratory Care Services Policy Number: [id number] Title: Oxygen Protocol Effective Date: 01/1996 Revised Date: 06/2013, 02/29/2016, 4/2018, 7/2019 Reviewed Date: 05/2014, 03/2015, 06/2017 I. Policy: The Oxygen Protocol will be initiated for all patients upon written order from physician for administration of oxygen therapy or if a patient is found to have an oxygen saturation of 88% or less. Special consideration: the goal of oxygen therapy for COPD patients is to maintain oxygen saturation between 88% - 92% to comply with GOLD Guidelines. II. Purpose: To provide protocol driven respiratory therapy for the administration of oxygen at concentrations greater than that in ambient air with the intent of treating or preventing the symptoms and manifestations of hypoxia. III. Responsibility: Director Respiratory Care Services, all Respiratory Care Practitioners IV. Indications: A. Implement this protocol for patients when physician orders oxygen to be administered or when patient is found to have an oxygen saturation of 88% or less. B. To assure routine monitoring of patient's oxygen saturation b.i.d. and to make appropriate adjustments in accordance with ordered oxygen saturation parameters. C. To assure continuity of respiratory care that meets [facility] Clinical Practice Guidelines and GOLD Guidelines. D. Hb < 8 
E. Sickle Cell anemia crisis V. Assessment:  Assess the following parameters to determine the need to adjust oxygen: A. Measurement of patient's oxygen saturation via pulse oximetry. B. Observation of patient's color, respiratory effort, and responsiveness. C. Measurement of heart rate and respiratory rate. D. Complete a three-step ambulatory oxygen saturation when ordered. VI. Initiation:  Upon receipt of an order for oxygen, the RCP will: A. Verify order in the patient's EMR, which should include the desired oxygen saturation to be maintained. B.  The patient shall be placed on oxygen with humidity (except for those oxygen delivery devices that do not require humidity, i.e. venturi masks and non-rebreather masks) as ordered by the physician to achieve the prescribed oxygen saturation. C. In the event that no saturation is specified, a saturation of 90% will be maintained. D. Patients, who are found to have a SaO2 of 88% or less, may be started on supplemental oxygen as described above. E. Patients admitted with cardiac problems/disease shall be maintained at 92% per Chest Committee recommendation. F. The patient will be informed of the \"no smoking policy\" and instructed in the proper use of oxygen therapy. G. Once desired oxygen saturation has been achieved, the RCP will document FIO2 and oxygen saturation in the respiratory section of the patient's EMR. VII. Maintenance: A. 30-second oxygen saturation check will be taken to maintain the saturation ordered by the physician each day. B. Patients will be assessed each shift and as needed by pulse oximetry to determine if oxygen needs to be decreased, increased or discontinued. C. If changes in FIO2 are indicated, all changes will be documented in the respiratory section of the patient's EMR. D. If no changes in FIO2 are required, the patient's oxygen flow rate and saturation will be recorded in the respiratory section of the patient's EMR. E. Per [facility], patients who are receiving oxygen therapy but are not on oxygen at home, should be weaned off oxygen as soon as possible or when anticipated discharge becomes evident. [place] Guard will be discontinued after oxygen saturation has been maintained for 24 hours on room air and documented in the patient's EMR. G. Patients on the Inpatient Rehabilitation area on 9th floor will be exempt from having their oxygen discontinued per protocol. Oxygen may be weaned but will be changed to prn to meet the needs of the patient when exercising and participating in therapy. H.  The goal of oxygen therapy is to maintain patients with a diagnosis of COPD at oxygen saturation between 88% - 92% to comply with GOLD Guidelines. VIII. Safety: RCP will address the following safety issues: A. Identify patient using the two patient identifiers name and birth date via ID bracelet. B. Perform hand hygiene per hospital policy utilizing Standard Precautions for all patients and following transmission-based isolation as indicated per hospital policy. C. Cardiac patients will be maintained at an oxygen saturation of 92%. D. If a patient's FIO2 requirements necessitate changing oxygen delivery devices to a high concentration of oxygen, documentation indicating the change must be included in the progress notes, as well as in the respiratory flowsheet. E. If a patient has a hemoglobin level <8 mg. RCP will consult physician before discontinuing oxygen. IX. Interventions: A. RCP will assess patient for signs of respiratory distress or suspicion of CO2 retention. B. An ABG may be obtained for patients exhibiting respiratory distress or sickle cell      crisis. C. An order should be entered into patient's EMR for ABG under per protocol. X. Documentation A. Document assessment findings in the respiratory section of the patient's EMR. B. Document changes in therapy per protocol in the respiratory orders section and in the care plan section of the patient's EMR. C. Document patient education in the patient education section of the patient's EMR. XI. Reportable Conditions:  Report to the physician immediately: A. Acute changes in patient's respiratory status. B. An oxygen saturation <85%. C. A change in oxygen delivery device to provide a high concentration of oxygen. XII. Patient Instructions: Review with Patient A. Purpose of oxygen therapy B. Proper technique for using oxygen C. No smoking policy Approval: Pulmonary Committee (1-25-96) Revision: Chest Committee (4-28-05) L - Respiratory Care Department Policy, Procedure and Protocol Guideline Manual, 1995, KULDIP Sifuentes. L - Therapist Driven Respiratory Care Protocols  A Practitioner's Guide for Criteria-Based Respiratory Care by Cecily Melgar M.D., and KULDIP Packer RRT. L - The rationale for therapist-driven protocols: an update. Respiratory Care 1998. N  [facility] Clinical Practice Guidelines. Respiratory Care Services Policy Number: -[id number] Title: Patient Care Assessment Protocol Effective Date: 01/1999 Revised Date: 05/2014, 04/2018, 12/2018, 07/2019 Reviewed Date: 06/2013/ 03/2015, 03/2016, 06/2017 Overview In an effort to improve quality and reduce costs of respiratory care at [facility], the Respiratory Department has developed a number of Patient Care Protocols. These protocols have been developed according to Yulisa 3 and are utilized for those patients who are ordered respiratory therapy using therapeutic indications and standardized approaches for accomplishing objectives. Patient Care Protocols are intended to improve care by: ? Defining the indications and standards of care agreed upon by the Pulmonary Medicine and [address] [place] [address] of [facility]. ? Training respiratory care practitioners to apply those criteria to individual patients and modify therapy as indicated by the protocols. ? Documenting the indication and care plan as part of the initial ordering process. ? Tapering or discontinuing treatments once the indication for therapy changes. The Patient Care Protocols shall be universally applied throughout the hospital as determined by  
the Pulmonary Medicine and [address] [place], [place] [address]e. Rationale for Patient Care Assessment Protocols: 
? Continuous Quality Improvement ? Cost containment ? Standardization of care 
? Enhanced continuity of care ? Utilization review ? Timely intervention The following patient care assessment protocols have been developed: ? Aerosolized Medication Protocol http://[email or web address]"[employer]s/[facility]/stfrancis/policies/Respiratory%20Care%20Services%20Policies/-[id number]. doc ? Bronchial Hygiene Protocol http://[email or web address]/localG[employer]/gv/stfrancis/policies/Respiratory%20Care%20Services%20Policies/-[id number]. doc 
? Oxygen Protocolhttp://spb/[employer]/gvl/stfrancis/policies/Respiratory Care Services Policies/-[id number]. doc http://spLogRhythmb/[employer]/[facility]/stfrancis/policies/Respiratory%20Care%20Services%20Policies/-[id number]. doc 
? CVRU Fast Track Weaning Protocol http://spb/local[employer]/[facility]/stfrancis/policies/Respiratory%20Care%20Services%20Policies/-[id number]. doc ? Asthma Treatment Protocol ERhttp://spb/[employer]/[facility]/stfrancis/policies/Respiratory Care Services Policies/-[id number]. doc http://spLogRhythmb/[employer]/[facility]/stfrancis/policies/Respiratory%20Care%20Services%20Policies/-[id number]. doc ? Pediatric Asthma Treatment Protocol ERhttp://spb/local[employer]/[facility]/stfrancis/policies/Respiratory Care Services Policies/-[id number]. doc http://spb/local[employer]/[facility]/stfrancis/policies/Respiratory%20Care%20Services%20Policies/-[id number]. doc ? Alpha-1 Antitrypsin Deficiency Protocolhttp://spLogRhythmb/localCalifornia [employer]/gvl/stfrancis/policies/Respiratory Care Services Policies/-[id number]. doc http://spb/local[employer]/[facility]/stfrancis/policies/Respiratory%20Care%20Services%20Policies/-[id number]. doc ? Prone Positioning Protocol http://filomena/[facility]/stancis/policies/Respiratory%20Care%20Services%20Policies/-[id number]. doc 
? COPD Protocol http://filomena/[facility]/stfrancis/policies/Respiratory%20Care%20Services%20Policies/-[id number]. doc 
?  Home Oxygen Assessment Protocolhttp://filomena/[employer]/[facility]/stfrjoseis/policies/Respiratory Care Services Policies/-[id number]. doc http://spb/[facility]/stfrancis/policies/Respiratory%20Care%20Services%20Policies/-[id number]. doc 
? Ventilator Weaning Protocol http://spweb/[facility]/stfrancis/policies/Respiratory%20Care%20Services%20Policies/-[id number]. doc ? Lung Volume Expansion Protocolhttp://spweb/localstems/[facility]/stfrancis/policies/Respiratory Care Services Policies/-[id number]. doc http://[facility]/stfrancis/policies/Respiratory%20Care%20Services%20Policies/-[id number]. docm The Director of [address] [place] [address] oversees the Patient Care Assessment Program. The Respiratory Educator is responsible for protocol development and training. The Supervisor is responsible for implementation and  activities. Each patient with an order for respiratory treatments will receive an evaluation. Respiratory Care Practitioners (RCP's) will perform the evaluations. The same evaluation tool will be utilized for initial and follow-up assessments. If the patient does not meet criteria for ordered therapy, the therapy will be discontinued. If the patient demonstrates an adverse response to initially ordered therapy, the therapy will be discontinued and the physician will be contacted. Specific physician's orders that deviate from protocols and are deemed \"inappropriate\" or \"unsafe\" will be addressed with ordering physician and/or medical director as required. Respiratory Patient Care Assessment Protocols I. Policy: In an effort to provide quality patient care and effective utilization of services, physicians who order respiratory therapy will have their patients treated via the protocols established (see attached) Respiratory Care Practitioners (RCP's) will complete the initial assessment which will indicate patient needs,  the care plan developed and will performed within 24 hours of admission.  Frequency of the therapy will be set according to the results of the respiratory therapy evaluation and frequency guidelines policy. Reassessment will be continued every 48 hours and more frequently as needed for the individual patient. II. Purpose: F. To provide a process that will allow for ongoing assessment and care plan modification for patients receiving respiratory services based on both objective and or subjective patient responses to interventions. This process of protocol utilization will assist in patient care progression while eliminating the need for the physician to continually update respiratory therapy orders. G. To assure continuity of respiratory care that meets [facility] Clinical Practice Guidelines. III. Initiation:  Implement Respiratory Care Protocols for patients who are ordered by physician  
       to receive respiratory therapy procedures or for ventilator management. IV. Protocol: 
E. Upon receiving an order for therapy the RCP will review the patient's EMR (electronic medical record) for all pertinent information includin. Physician's order for therapy 2. Patient history and physical examination 3. Physician progress notes 4. Diagnostic. X-rays, PFT's, arterial blood gases etc. 
F. The RCP will perform a respiratory assessment in the following manner: 1. General observations: color, pattern and effort of breathing, chest expansion, (symmetrical and bilateral), level of consciousness and the ability to ambulate. 2. The RCP will assess patient's cough ability and determine if bronchial hygiene is needed. If patient is unable to produce sputum, at that time, the RCP should question the patient with regard to their sputum: production, color consistency, frequency and amount. 3. Auscultation: Using a stethoscope, the RCP will listen and note quality of breath sounds and presence or absence of adventitious breath sounds in all lung fields, both anteriorly and posteriorly. 4. Upon completing the EMR review and physical assessment, the RCP will document findings in the RT Assessment section of the EMR. The score level will be provided and will be used to determine the frequency of therapy. V. Indications: A. Bronchial Hygiene Protocol indications: 1. Potential for or presence of atelectasis. 1. Need for hydration and removal of retained secretions. 1. Need for improvement of cough effectiveness. 1. Presence of conditions associated with disorder of pulmonary clearance: 
a. Cystic fibrosis b. Bronchiectasis 
c. Neuromuscular disease 
d. Obstructive lung diseases 
e. Restrictive lung diseases Aerosolized Medication(s) Protocol indications:Treatment of bronchospasm/wheezing 2. Improvement of mucociliary clearance 3. Treatment of stridor 4. History of Bronchiectasis, Asthma or COPD 
C. Oxygen Therapy Protocol indications: 1. Documented hypoxemia 2. Severe trauma 3. Acute myocardial infarction 4. Short-term therapy (e.g. post anesthesia recovery) D. CVRU Ventilator Weaning Protocol indications: 1. All mechanically ventilated surgical patients unless they have a no wean order. E. Asthma Treatment Protocol ER indications: 1. Patients 15years of age and older that have been triaged or diagnosed with   asthma exacerbation shall be indicated for the ER Asthma Treatment Protocol. A physician order will be required to initiate the protocol. F. Pediatric Asthma protocol in the ER indications: 1. Patients less than 15years old that have been triaged or diagnosed with asthma exacerbation shall be indicated for the Pediatric Asthma protocol. A physician order will be required to initiate the protocol. G. Alpha-1 Antitrypsin Deficiency Testing protocol indications: 1. Patients admitted and diagnosed with COPD. H. Prone Positioning Protocol indications: 
       1. Acute lung injury 2. Acute respiratory distress syndrome (ARDS) I.  Respiratory Care COPD Protocol indications: 1. History of COPD in patient's records 2. Smoking history J. Home Oxygen Assessment Protocol indications: 1. Chronic lung disease 2. Cor pulmonale 3. Unable to wean to room air 48 hours prior to anticipated discharge. K.  Ventilator Weaning Protocol indications: 1. Patient's mechanically ventilated 2. Managed by intensivist 
L. Lung Volume Expansion Protocol indications: 
      1. Any patient at risk for pulmonary complications. VI. Maintenance: H. Timely patient assessment is an integral part of this protocol therefore the following will be applied: 1. All non- critical care patients will be evaluated upon receiving initial respiratory care orders within 24 hours and re-evaluated within 48 hours (or more as needed). 2. Orders requesting a Respiratory Consult will be responded to in the following manner: 
a. In patient emergency situations, the RCP assigned to the floor will respond immediately to the patient, provide an initial respiratory assessment, and contact the patient's physician as necessary for appropriate orders. b. In non-emergent situations, the RCP assigned to the floor will respond to the patient within 90 minutes and provide an initial respiratory assessment and contact patient's physician as necessary for appropriate orders. c. An RCP will provide a comprehensive assessment as soon as possible. 3. Upon completion of an evaluation, the RCP will complete documentation in the patient's EMR in the RT Assessment section. 4. The RCP who completes the assessment will document orders for therapy in the orders section of the patient's EMR selecting new order. Next, per protocol should be selected indicating it is a protocol order and sign orders should be selected to complete the process.  The applicable protocol must be added to the progress note per Joint Commission guidelines. 5. The Pharmacy and Therapeutics (P&T) Committee has mandated that the medication Xopenex may be changed to unit dose albuterol without an order, except for those patients receiving Xopenex due to cardiac arrhythmias. 1. The dosage for these patients should be 0.63 mg. and may be changed from 1.25 mg. to 0.63 mg per P & T Committee by the RCP completing the assessment. 6. Patients who are not experiencing cardiac arrhythmias, and are ordered Xopenex and Atrovent may be changed to Duoneb. VII. Safety:       
I. The following safety issues shall be monitored: 1. The RCP will perform hand hygiene per hospital policy utilizing Standard Precautions for all patients and following transmission-based isolation as indicated per hospital policy. 2. The RCP must exercise professional judgment in classifying the patient for frequency of therapy. 3. Appropriate classification of the patient will require an evaluation utilizing the Therapy Assessment Protocol Guidelines. 4. The RCP will confer with the physician concerning the care of the patient at any time questions or problems arise. 5. If during therapy, the patient exhibits no improvement, or deterioration in clinical status the RCP will notify the physician and the patient's nurse. VIII. Interventions:  
F. The patient's nurse is responsible concerning all items related to his/her care. Ongoing communication with nursing is essential to successful protocol management. G. The RCP recognizes the value of the team approach in meeting the patient's needs. Nursing input regarding the patient's pulmonary condition will be sought as needed. IX. Reportable conditions: The RCP will inform the physician if: 1. There are acute changes in patient's respiratory status. 2. The therapist is unable to determine appropriate care plan upon assessment. 3. The patient fails to reach therapeutic objective. 4. A change or additional medication is needed. X.  Patient Education:   
D. Patient will receive instruction on the followin. The treatment modality, including objectives and proper technique of therapy 2. Respiratory medications E. Documentation shall occur in the patient education section of the patient's EMR. XI. Documentation: Record all findings as described above in the patient's EMR. Related Protocols: A. Aerosolized Medication Protocol B. Bronchial Hygiene C. Oxygen Protocol D. [facility] Fast Track Weaning Protocol E. Asthma Treatment protocol ER 
F. Alpha-1 antitrypsin Deficiency Protocol G. Prone Positioning Protocol H. Respiratory Care COPD Protocol I. Home Oxygen assessment Protocol J. Ventilator Weaning Protocols K. Volume Expansion protocol Indications      Frequency          Level A. Aerosol therapy 1.  q4h     Severe SOB, wheezing, unable to sleep 1 2.  qid, q4 wa or q6h   Moderate SOB, wheezing   2 3.  tid      Hx of asthma, or COPD mild wheezing,  
      or facilitate secretion removal              3 
 4.  bid      Asthma, or COPD, Intermittent wheezing 4 5. PRN, i.e. tid PRN, qid PRN Asthma, or COPD, occasional wheezing 5 B. Bronchopulmonary Hygiene 1. q4h             Copious secretions, SOB, unable to sleep 1 2. qid & PRN            Moderate amounts of secretions   2 3. tid           Small amounts of secretions and poor cough,   
           history of secretions    3 4. PRN, i.e. tid PRN, qid PRN     Breathing exercises, encourage cough only 4  
  
C. Oxygen Therapy     Follow hospital approved Oxygen Protocol Note: 
qid treatments are due 0800, 1200, 1600, and 2000. tid treatments are due 0800, 1400, and 2000 Q6h treatments are due 0800, 1400, 2000, 0200 Q4 wa teatments are due 0800, 1200, 1600, and 2000. Q4h treatments are due  0800, 1200, 1600, 2000, 0000, and 0400.  
     
The Level 1-5 rating system is only to be used as criteria for determining appropriate frequency of therapy. References:  
[address] Standard L    Respiratory Care Department Policy, Procedure and Protocol Guideline Manual, 1995, KULDIP REYNOSO  Therapist Driven Respiratory Care Protocols  A Practitioner's Guide for Criteria-Based Respiratory Care by Janel Odonnell M.D., and KULDIP Streeter, RRT. L  The rationale for therapist-driven protocols: an update. Respiratory Care 1998; 74:353-086 L Therapist Driven Respiratory Care Protocols  A Practitioner's Guide for Criteria-Based Respiratory Care by Janel Odonnell M.D., and KULDIP Streeter, RRT. L The rationale for therapist-driven protocols: an update. Respiratory Care 1998; [id number]. N   [facility] Clinical Practice Guidelines. D. The RCP will perform a respiratory assessment in the following manner: 1. Perform hand hygiene per hospital policy utilizing Standard Precautions for all patients and following transmission-based isolation as indicated per policy. 2. Identify patient via ID bracelet verifying patient name and birth date. 3. General observations: color, pattern and effort of breathing, chest expansion, (symmetrical and bilateral), level of consciousness and the ability to ambulate. 4. The RCP will assess patients cough ability and determine if Nasotracheal suctioning is needed. If patient is unable to produce sputum, at that time, the RCP should question the patient with regard to their sputum: production, color consistency, frequency and amount. 5. Auscultation: Using a stethoscope, the RCP will listen and note quality of breath sounds and presence or absence of adventitious breath sounds in all lung fields, both anteriorly and posteriorly. 6. Upon completing the EMR review and physical assessment, the RCP will document findings in the RT Assessment section of the EMR. The score level will be provided and will be used to determine the frequency of therapy. V. Indications: A.  Indications for Bronchial Hygiene Protocol will include: 1. Potential for or presence of atelectasis. 2. Need for hydration and removal of retained secretions. 3. Need for improvement of cough effectiveness. 4. Presence of conditions associated with disorder of pulmonary clearance: 
a. Cystic fibrosis b. Bronchiectasis B. Indications for Aerosolized Medication(s) Protocol should include: 1. Treatment of bronchospasm/wheezing 2. Improvement of mucociliary clearance 3. Treatment of stridor 4. History of Asthma or COPD 
           C.  Indications for Oxygen Therapy Protocol should include: 1. Documented hypoxemia 2. Severe trauma 3. Acute myocardial infarction 4. Short-term therapy (e.g. post anesthesia recovery) VI. Maintenance:   
D. Timely patient assessment is an integral part of this protocol therefore the following will be applied: 1. All non- critical care patients will be evaluated upon receiving initial respiratory care orders within 24 hours and re-evaluated within 48 hours (or more as needed). 2. Orders requesting a Respiratory Consult will be responded to in the following manner: 
a. In patient emergency situations, the RCP assigned to the floor will respond immediately to the patient, provide an initial respiratory assessment, and contact the patients physician as necessary for appropriate orders. b. In non-emergent situations, the RCP assigned to the floor will respond to the patient within 90 minutes and provide an initial respiratory assessment and contact patients physician as necessary for appropriate orders. c. An RCP will provide a comprehensive assessment as soon as possible. 3. Upon completion of an evaluation, the RCP will complete documentation in the patients EMR in the RT Assessment section. 4. The RCP who completes the assessment will document orders for therapy in the orders section of the patients EMR selecting new order.  Next, per protocol should be selected indicating it is a protocol order and sign orders should be selected to complete the process. 5. The Pharmacy and Therapeutics (P&T) Committee has mandated that the medication Xopenex may be changed to unit dose albuterol without an order, except for those patients receiving Xopenex due to cardiac arrhythmias. The dosage for these patients should be 0.63 mg. and may be changed from 1.25 mg. to 0.63 mg per P & T Committee by the RCP completing the assessment. 6. Patients who are not experiencing cardiac arrhythmias, and are ordered Xopenex and Atrovent may be changed to Duoneb. VII. Safety: A. The following safety issues shall be monitored: 1. The RCP will perform hand hygiene per hospital policy utilizing Standard Precautions for all patients and following transmission-based isolation as indicated per hospital policy. 2. The RCP must exercise professional judgment in classifying the patient for frequency of therapy. 3. Appropriate classification of the patient will require an evaluation utilizing the Therapy Assessment Protocol Guidelines. 4. The RCP will confer with the physician concerning the care of the patient at any time questions or problems arise. 5. If during therapy, the patient exhibits no improvement or deterioration in clinical status the RCP will notify the physician and the patients nurse. VIII. Interventions: A. The patients nurse is responsible concerning all items related to his/her care. Ongoing communication with nursing is essential to successful protocol management. B. The RCP recognizes the value of the team approach in meeting the patients needs. Nursing input regarding the patients pulmonary condition will be sought as needed. IX. Reportable conditions: The RCP will inform the physician if: 1. There are acute changes in patients respiratory status.  
2. The therapist is unable to determine appropriate care plan upon assessment. 3. The patient fails to reach therapeutic objective. 4. A change or additional medication is needed. X.  Patient Education: A. Patient will receive instruction on the followin. The treatment modality, including objectives and proper technique of therapy 2. Respiratory medications B. Documentation shall occur in the patient education section of the patients EMR. XI. Documentation: Record all findings as described above in the patients EMR. Related Protocols: A. Aerosolized Medication Protocol B. Bronchial Hygiene C. Oxygen Protocol D. Volume Expansion/Secretion Clearance E. Ventilator Weaning Protocols References: 
[address] Standard L    Respiratory Care Department Policy, Procedure and Protocol Guideline Manual, , KULDIP REYNOSO  Therapist Driven Respiratory Care Protocols  A Practitioners Guide for Criteria-Based Respiratory Care by Merline Arnold M.D., and KULDIP Granados RRT. L  The rationale for therapist-driven protocols: an update. Respiratory Care 1998; 73:893-571 N   [facility] Clinical Practice Guidelines. Respiratory Care Services Policy Number: -[id number] Title: Aerosolized Medication Protocol Effective Date: 10/1998 Revised Date: , , ,  Reviewed Date: / 03/15 , ,  I. Policy: The Aerosolized Medication Protocol shall by implemented by Respiratory Care Practitioners (RCP) for patients with orders to receive aerosol therapy with medication. II. Purpose: To open and maintain obstructed airways, the RCP, will utilize the following  
protocol to select the indicated aerosolized medication(s) and determine the most effective method of delivery to the patient. III. Patient Type: All patients who are determined to meet aerosolized medication criteria as  
       outlined in this protocol. IV.  Responsibility: Director, Radha Lawrence, registered Respiratory Care Practitioners (RCP's) with documented competency in the performance of respiratory therapeutic techniques. V. Equipment needed: Elyn Legacy I. Pulse oximeter J. AeroEclipse nebulizer K. Dry Powder Inhaler (DPI) VI. Protocol:  
G. The following conditions are accepted indications for aerosolized medication therapy. 5. Bronchospasm/wheezing 6. Impaired mucociliary clearance 7. Tracheobronchial mucosal congestion/and laryngeal stridor 8. Diseases which commonly require aerosolized medication therapy include, but are not limited to: 
f. Asthma/reactive airway disease 
g. Bronchitis/emphysema (COPD) h. Cystic fibrosis 
i. Severe laryngitis/tracheitis 
j. Bronchiectasis 
k. Smoke inhalation or chemical trauma to the lung or upper airway 
l. Physical trauma to the upper airway 
m. Laryngotracheobronchitis 
n. Bronchiolitis 
o. Non-specific wheezing B. Indications for bronchodilator medications will include: 
d. Bronchospasm/ wheezing 
e. Asthma/reactive airway disease 
f. Chronic obstructive pulmonary disease 
g. Obstructive defect on pulmonary function testing C. Administration of medications 5. If a bronchodilator or any other type of respiratory medication is needed, a physician order must be indicated in the medication section in the patient's EMR. 6. When the physician specifies the medication and dosage at the time of request, the ordered medication will be used as part of the care plan. D. The following guidelines will be utilized in the evaluation and selection of the appropriate delivery device for indicated medication(s): 
1. Unassisted aerosol (UA) is the preferred method of aerosol delivery and indicated if 
c. Ventilation is inadequate 
d. Patient demonstrates wheezing  
e. Routine treatments shall be given via the AeroEclipse nebulizer. f. The Aerogen nebulizer shall be used in the following circumstances: i. ER patients and they will continue with this nebulizer if admitted to 8th floor or ICU. 
ii. Patients in ICU  
iii. Patients on 8th floor with severe wheezing (at the RCP's discretion) 2. Dry PowderInhaler (DPI)  
d. Patient should be alert/cooperative 
e. Able to perform 3 second breath hold. f. Patient has used DPI therapy previously, either at home or in the hospital. 
g. Note: The only approved inhaler on formulary is Spiriva. VII. Guidelines:  
Monitor patient's vital signs and evaluate patient's clinical status. The need to change medication and/or modality may be indicated by: 5. A pulse greater than 120 bpm, or if a pulse increase of 20 bpm occurs with bronchodilator medications. 6. Significant worsening of dyspnea or wheezing occurring during or within 30 minutes of discontinuing therapy. 7. Worsening of patient's sensorium (e.g. patient becomes confused or obtunded, and unable to follow directions). 8. Worsening of patient's chest x-ray. 9. Change in sputum (e.g. increased pulmonary infiltrate, which might indicate need for volume expansion therapy). 10. Patient has difficulty coughing up secretions, which might indicate need for acetylcysteine and/or bronchial hygiene therapy. 11. Call physician immediately if dyspnea worsens and is not responsive to modifications allowed by protocol. VIII. Clinical Responsibility: 
2. The therapy assessment guidelines will be used to evaluate all patients receiving aerosolized medications with the exception of critical care areas. 5. RCP's will perform changes in therapy per protocol. 6. It will be the responsibility of RCP to provide instruction regarding respiratory medications, possible side effects, aerosol therapy and proper DPI technique, as well as, spacer usage to patients ordered DPI therapy. 7. Current therapy that is part of a patient's home regimen will not be discontinued. a. Provide spacer and educate patient on proper inhaler technique if needed. IX. Documentation D. Document assessment findings in the respiratory assessment section of the patient's EMR. E. Document changes in therapy per protocol in the respiratory orders section and in the care plan section of the patient's EMR. F. Document patient education in the patient education section of the patient's EMR. X. Outcome Criteria: 
I. Relief of wheezes and obstruction J. Improved cough and sputum color and consistency K. Improved chest x-ray L. Improved arterial oxygen tension and or SaO2 M. Improved Peak Flow on asthmatic patients XI. Related Protocols: J. Respiratory Patient Care Protocols K. Bronchial Hygiene Therapy L. Oxygen Protocol Reference: L - Respiratory Care Department Policy, Procedure and Protocol Guideline Manual, 1995, KULDIP Sifuentes. L -  Therapist Driven Respiratory Care Protocols  A Practitioner's Guide for Criteria-Based Respiratory Care by Cameron Boyce M.D., and KULDIP Santos, BRISEYDA. L - The rationale for therapist-driven protocols: an update. Respiratory Care 1998; [id number].  -[facility] Clinical Practice Guidelines.

## 2019-10-18 NOTE — PROGRESS NOTES
MD Merecdes made aware of pt's RUE swelling, no orders at this time. This nurse got positive blood return from RT midline IV, flushes without difficulty. Will continue to monitor.

## 2019-10-18 NOTE — DIABETES MGMT
Patient admitted from [facility] with acute respiratory failure with hypoxia. Admitting blood glucose 315. Blood glucose range yesterday 123-247 with pt receiving Lantus 15 units, Humalog 8 units, TPN with 10 units of insulin, Solumedrol 120mg. This AM blood glucose 314. Most recent FSBS 397. Hgb 6.5. WBC 15.6. Creatinine 2.42. GFR 21. Pt receiving D5W 100mL/hr. Insulin gtt order via [facility]. Pt received Lantus 25 units this AM. Spoke with primary RN plan is to d/c subcutaneous insulin. Spoke with nutrition, plan is to treat blood glucose levels using insulin gtt. Pt currently intubated. Reviewed pt chart per provider note pt is DNR, and family is considering comfort care measures. Given pt condition will follow along loosely.

## 2019-10-18 NOTE — PROGRESS NOTES
Bedside and Verbal shift change report given to [address] [place] [address] [place] (oncoming nurse) by Lennard Habermann RN (offgoing nurse). Report included the following information SBAR, Kardex, Intake/Output, MAR, Recent Results, Med Rec Status, Cardiac Rhythm SR 71 and Alarm Parameters .

## 2019-10-18 NOTE — PROGRESS NOTES
Nutrition F/U: 
TPN Management for [facility]. Assessment: 
Weight 89.9 kg (ICU bed 10/17/19), edema - anasarca. The patient remains intubated, ventilated, sedated and paralyzed. TPN was started last evening for nutrition support since her last known intake was unknown, suspected to be before her PEG was placed. Dextrose-based IVF was stopped at that time. This AM the dextrose-based IVF was resumed at a higher rate due to persistent hypernatremia. Labs are remarkable for sodium 147, potassium 4.2 (trending upward), , creatinine 2.42, phosphorus 5.1, magnesium 2.3,  and AM glucose 345; POC glucose (10/17) 173,671,273,459 and (10/18) 314. Ten units insulin/liter was added to the TPN last evening, however POC glucoses have come back high and expect them to be higher since the D5W was started. She continues on the same steroid dose. IV Access: 
 Right IJ QLC. Enteral Access: 
 PEG. Macronutrient needs: (IBW/52.2kg) MUV:  0356-7704 PKUO /day (25-30 kcal/kg IBW) EPR:  70-88 grams protein/day (0.8-1 grams/kg ABW) Max CHO:  300 grams/day (4 mg/kgIBW/min/day) Fluid:  1ml/kcal 
Intake/Comparative Standards: 
Current intake of TPN - 1.68 liters (70 ml/hr) of 4.25%AA/10%DEX provides 857 calories/day (66% calorie goal), 71 grams protein/day (101% protein goal), 168 grams CHO/day (does not exceed max CHO limit) and 1680 ml water/day (100% fluid goal). D5W @ 100 ml/hr contributes another 408 calories/day and 120 gm CHO/day. Intervention:  
Meals and Snacks: NPO. Enteral Nutrition: on hold due to the use of a paralytic. Parenteral Nutrition/IV Fluid: 1) Continue TPN as 4.25%AA/10%DEX, add 250 ml 20% lipids - 1357 calories/day (100% calorie goal), 71 grams protein/day (101% protein goal), 168 grams CHO/day (does not exceed max CHO limit) and 1680 ml water/day (100% fluid goal). 2) Additives/liter: 20 potassium (chloride), 20 insulin. 3) MTE, MVI and Pepcid daily. 4) D5W @ 100 ml/hr per pulmonary. Mineral Supplement Therapy: Nutrition Support Orders/Electrolyte Management Replacement Protocols are active on the MAR. Coordination of Nutrition Care by a Nutrition Professional: AM ICU rounds, collaboration with Librado Silva. Nutrition Discharge Plan: Too soon to determine. Brito Fraction. Antonieta Arechiga 
[phone] Addendum: 
Received calls from the diabetes educator and pharmacy notifying me that the patient is being started on an insulin drip due to POC glucose @ 0950 now 397. Will remove all of the insulin from the TPN scheduled for this evening's bag. Lantus was increased this morning and the patient received the increased dose at that time. Should stop the Lantus insulin while the drip is infusing so that the patient receives insulin from 1 source only. Brito Fraction. Antonieta Allenilor 
[phone]

## 2019-10-18 NOTE — PROGRESS NOTES
Ventilator check complete; patient has a #8.0 tracheostomy. Patient is sedated. Patient is not able to follow commands. Breath sounds are diminished. Trachea is midline, Negative for subcutaneous air, and chest excursion is symmetric. Patient is also Negative for cyanosis. All alarms are set and audible. Resuscitation bag is at the head of the bed. Ventilator Settings Mode FIO2 Rate Tidal Volume Pressure PEEP I:E Ratio PRVC  85 %   25 310 ml     10 cm H20  1:1.7 Peak airway pressure: 40 cm H2O Minute ventilation: 8.1 l/min ABG: No results for input(s): PH, PCO2, PO2, HCO3 in the last 72 hours.  
 
 
Frida Sessions, RT

## 2019-10-18 NOTE — PROGRESS NOTES
Ventilator check complete; patient has a #8.0 tracheostomy. Patient is sedated. Patient is not able to follow commands. Breath sounds are coarse. Trachea is midline. Patient is also Negative for cyanosis. All alarms are set and audible. Resuscitation bag is at the head of the bed. Ventilator Settings Mode FIO2 Rate Tidal Volume Pressure PEEP I:E Ratio Pressure control  80 %   30     28 cm H20 10 cm H20  1:1.7 Peak airway pressure: 38 cm H2O Minute ventilation: 8.3 l/min ABG: No results for input(s): PH, PCO2, PO2, HCO3 in the last 72 hours.  
 
 
Mynor Lantigua, RT

## 2019-10-18 NOTE — PROGRESS NOTES
Infectious Disease Progress Note Today's Date: 10/18/2019 Admit Date: 10/14/2019 Impression: · Hypoxic respiratory failure / ARDS · Rhinovirus PNA (19) · Atrial fibrillation with RVR - paroxysmal  
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 
· S/p trach/PEG, 10/10/19 · H/o PE - on anticoagulation · Multiple sclerosis · Scleroderma Plan:  
· Pt stable off of antibiotics today. Remains on high vent settings and team may transition to comfort care soon based on family wishes. · Continue to observe off of antibiotics. · ID will sign off. Please call back with questions or concerns. Anti-infectives: · Off abx Subjective:  
Date of Consultation:  2019 Interval History: remains on high O2 settings; paralyzed; possible transition to comfort care based on conversations with family; Allergies Allergen Reactions  Aspirin Unable to Obtain  Codeine Unable to Obtain  Propoxyphene Unable to Obtain Review of Systems:  Review of systems not obtained due to patient factors. Objective:  
 
Visit Vitals /58 Pulse 61 Temp 97.9 °F (36.6 °C) Resp 25 Ht 5' 2.99\" (1.6 m) Wt 89.9 kg (198 lb 3.1 oz) SpO2 93% BMI 35.12 kg/m² Temp (24hrs), Av.6 °F (36.4 °C), Min:96.5 °F (35.8 °C), Max:97.9 °F (36.6 °C) Lines:  Central Venous Catheter:    
 
Physical Exam:   
General:  Sedated and on the ventilator. Facial swelling noted;  
Eyes:  Sclera anicteric Mouth/Throat: Tongue protrusion noted Neck: Supple. Tracheostomy site clean  
Lungs:   Coarse breath sounds; diminished; no wheezing or rhonchi;  
Cardiovascular:  Nl rate; regular rhythm; no murmur; Abdomen:   Soft, nontender; hypoactive bowel sounds; Extremities: moderate diffuse edema noted Skin: No acute rash or lesions. Lymph Nodes: Musculoskeletal:  No swelling or deformity. Lines/Devices:  Intact, no erythema, drainage, or tenderness. Psychiatric: Sedated on ventilator Data Review: CBC: 
Recent Labs 10/18/19 
0340 10/17/19 
0324 10/16/19 
0319 WBC 15.6* 14.5* 14.7* HGB 6.5* 7.6* 7.6* HCT 22.5* 25.9* 25.5*  
 241 180 BMP: 
Recent Labs 10/18/19 
2728 10/17/19 
4060 10/16/19 
0319 CREA 2.42* 2.25* 2.02* * 143* 137* * 147* 149*  
K 4.2 4.0 3.5 * 113* 114* CO2 28 24 26 AGAP 7 10 9 * 211* 187* LFTS: 
Recent Labs 10/16/19 
0319 TBILI 0.4 ALT 11* SGOT 11*   
TP 6.3 ALB 1.6* Microbiology:  
 
All Micro Results Procedure Component Value Units Date/Time CRYPTOCOCCUS AG W/REFLEX TITER [[id number]] Collected:  10/14/19 2269 Order Status:  Completed Specimen:  Serum Updated:  10/15/19 1346 Cryptococcus Ag NEGATIVE Imaging:  
10/17/19 CXR: FINDINGS: Diffuse bilateral lung edema or infiltrates are unchanged. The cardiac 
size is stable. No pneumothorax or pleural effusion is seen. The tracheostomy 
tube and right jugular central line remain in place. Signed By: Levy Rizvi MD   
 October 18, 2019

## 2019-10-18 NOTE — PROGRESS NOTES
[facility] PROGRESS NOTE Follow up for: MAE over ckd Subjective:  
Patient seen and examined. Chart, notes, labs, imaging, results all reviewed. Intubated and paralyzed No family present today ROS: 
UTO Objective:  
Exam: 
Vitals:  
 10/18/19 2382 10/18/19 1491 10/18/19 9290 10/18/19 5059 BP:  129/63 130/63 133/58 Pulse: 61 61 61 61 Resp: 25 25 25 25 Temp:      
SpO2: 93% 93% 93% 93% Weight:      
Height:      
 
 
 
Intake/Output Summary (Last 24 hours) at 10/18/2019 5738 Last data filed at 10/18/2019 0530 Gross per 24 hour Intake 2282.3 ml Output 1000 ml Net 1282.3 ml  
 
 
Current Facility-Administered Medications Medication Dose Route Frequency  0.9% sodium chloride infusion 250 mL  250 mL IntraVENous PRN  
 dextrose 5% infusion  100 mL/hr IntraVENous CONTINUOUS  
 insulin glargine (LANTUS) injection 25 Units  25 Units SubCUTAneous DAILY  TPN ADULT - dextrose 10% amino acid 4.25%   IntraVENous QPM  
 NUTRITIONAL SUPPORT ELECTROLYTE PRN ORDERS   Does Not Apply PRN  
 sodium chloride (NS) flush 5-40 mL  5-40 mL IntraVENous Q8H  
 sodium chloride (NS) flush 5-40 mL  5-40 mL IntraVENous PRN  
 atracurium (TRACRIUM) 1,000 mg in 0.9% sodium chloride 250 mL infusion  0-20 mcg/kg/min IntraVENous TITRATE  dilTIAZem (CARDIZEM) 100 mg in 0.9% sodium chloride (MBP/ADV) 100 mL infusion  0-15 mg/hr IntraVENous TITRATE  carboxymethylcellulose sodium (REFRESH LIQUIGEL) 1 % ophthalmic solution 1 Drop  1 Drop Both Eyes Q4H  
 acetaminophen (TYLENOL) tablet 650 mg  650 mg Per G Tube Q6H PRN  
 amantadine HCl (SYMMETREL) capsule 100 mg  100 mg Per G Tube BID  chlorhexidine (PERIDEX) 0.12 % mouthwash 15 mL  15 mL Swish and Spit Q12H  
 enoxaparin (LOVENOX) injection 90 mg  90 mg SubCUTAneous Q24H  
 glatiramer (COPAXONE) injection 40 mg (Patient Supplied)  40 mg SubCUTAneous Q MON, WED & FRI  
  hydroxychloroquine (PLAQUENIL) tablet 200 mg  200 mg Per G Tube DAILY  methylPREDNISolone (PF) (SOLU-MEDROL) injection 40 mg  40 mg IntraVENous Q8H  
 PARoxetine (PAXIL) tablet 20 mg  20 mg Per G Tube DAILY  insulin lispro (HUMALOG) injection   SubCUTAneous Q6H  
 fentaNYL in normal saline (pf) 25 mcg/mL infusion  0-200 mcg/hr IntraVENous TITRATE  propofol (DIPRIVAN) infusion  0-50 mcg/kg/min IntraVENous TITRATE  epoprostenol (FLOLAN) 30,000 ng/mL in diluent for epoprostenol (gly) 50 mL solution  10-50 ng/kg/min (Ideal) Nebulization TITRATE  
 0.9% Sodium Chloride for Flolan Infusion  8 mL/hr Nebulization TITRATE  
 
 
EXAM 
GEN - intubated CV - S1, S2, RRR, no rub, murmur, or gallop Lung - clear to auscultation bilaterally Abd - soft, nontender, BS present Ext - edema present Recent Labs 10/18/19 
0340 10/17/19 
0324 10/16/19 
0319 WBC 15.6* 14.5* 14.7* HGB 6.5* 7.6* 7.6* HCT 22.5* 25.9* 25.5*  
 241 180 Recent Labs 10/18/19 
4930 10/17/19 
0327 10/17/19 
9849 10/16/19 
7473 *  --  147* 149*  
K 4.2  --  4.0 3.5 *  --  113* 114* CO2 28  --  24 26 *  --  143* 137* CREA 2.42*  --  2.25* 2.02* CA 6.6*  --  8.0* 8.0*  
*  --  211* 187* MG 2.3 2.5*  --   --   
PHOS 5.1* 4.8*  --   --   
 
 
Assessment and Plan: 1. Solomon over CKD , non oliguric - SOLOMON possibly secondary to ATN from poor hemodynamic status  
- will repeat UA  
- Lasix held , has been receiving 60 mg q 8 hrs . Electrolytes wnl ,  
- Anasarca from severe hypoalbuminemia present  
- discussed with family about the pt current renal status and explained that dialysis may not improve any of her critical illness at this time as she continues to have severe ARDS/MS related and CI polyneuropathy and asked them  about their plans for further care . family agreed that the pt has been going through severe medical issues for several months now and has declined significantly . They also agreed upon not doing any further futile aggressive measures  
  
2. ARDS On solumedrol and vent  
  
3. MS Alexandre Fernandez On glatiramer Pt remains stable with renal function from yesterday - 
  
Marah Arriaza MD

## 2019-10-18 NOTE — INTERDISCIPLINARY ROUNDS
Interdisciplinary team rounds were held 10/18/2019 with the following team members:Care Management, Nursing, Nurse Practitioner, Nutrition, Palliative Care, Pastoral Care, Pharmacy, Physical Therapy, Physician and Respiratory Therapy. Plan of care discussed. See clinical pathway and/or care plan for interventions and desired outcomes.

## 2019-10-19 PROBLEM — A41.9 SEPSIS (HCC): Status: ACTIVE | Noted: 2019-01-01

## 2019-10-19 NOTE — PROGRESS NOTES
Ventilator check complete; patient has a #8.0 tracheostomy. Patient is sedated. Patient is not able to follow commands. Breath sounds are coarse. Trachea is midline, Negative for subcutaneous air, and chest excursion is symmetric. Patient is also Negative for cyanosis and is Negative for pitting edema. All alarms are set and audible. Resuscitation bag is at the head of the bed. Ventilator Settings Mode FIO2 Rate Tidal Volume Pressure PEEP I:E Ratio Pressure control  100 %    340 ml     12 cm H20  1:1.54 Peak airway pressure: 42 cm H2O Minute ventilation: 7.9 l/min ABG: No results for input(s): PH, PCO2, PO2, HCO3 in the last 72 hours.  
 
 
Georgina Dixon, RT

## 2019-10-19 NOTE — PROGRESS NOTES
Critical Care Daily Progress Note: 10/19/2019 Admission Date: 10/14/2019 The patient's chart is reviewed and the patient is discussed with the staff. 
 
 
69 y. o. female being transferred to the ICU from the [facility]). She was transferred there on Saturday but experienced need for increased oxygen support during transport (85% up to 100%). She had been hospitalized at [facility] on Sept 23rd with confusion. She was felt to be septic and had an elevated WBC and lactic acid level. She was hypoxic and her CXR showed bilateral infiltrates.  She was treated for pneumonia (? Bacterial versus viral versus aspiration). The viral panel was positive for rhinovirus. Her CXR worsened on 9/26 and she required intubation on 9/27.  She required paralysis for ventilation due to respiratory distress. The pulmonary infiltrates progressed and she was felt to have ARDS. She was started on lasix for suspected volume overload. Echo shows an EF 55 to 60 mmHg. She has MAE and is being followed by nephrology.       
  
  She gradually improved so was extubated on 10/3. She was noted to be confused and profoundly weak so was seen in consultation by neurology. 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 She had a brain MRI on Oct 4 which showed multiple, probable, MS lesions in the cerebrum and c spine (stable from previous examinations).   
  
   She developed acute respiratory failure on 10/8 requiring reintubation. She underwent trach and PEG placement on 10/10/19.  She has been tube feeding dependent but has not only had diarrhea but high gastric residuals.  The TF is currently on hold.  
  
   She underwent therapeutic bronchoscopy on 10/8 and 10/11 for mucus plugging. Bronchial washings reported enterococcus. She was restarted on antibiotics at the time of transfer to [facility] and recultured. All cultures from there are negative so far. She remains febrile and has a leukocytosis. Procalcitonin is 14.0.  She is requiring high amount of peep and FIO2 for oxygenation. She remains in respiratory distress. She has been sedated with Fentanyl. She developed rapid a fib after transfer so is now on a cardizem infusion and cardiology is following.  
  
   Her PMH is significant for multiple sclerosis and scleroderma. She is followed by Rheumatogy and treated with Plaquenil and 5 mg of Prednisone per day. Jay Trevino is monitored for pulmonary hypertension and RVSP in the past has measured 40 mmHg but more recent echo showed a RVSP of 32 mmHg. She also has SADIE with home CPAP that was started last year.  
  
    She was diagnosed with a pulmonary embolism in June of 2019. She was on Eliquis as an outpatient but is currently on Lovenox Attempted to prone 10/15/19, after a few hours we could not ventilate pt. -could only get TV of  100ml , vent changed out, cxr showed no ptx, she  Was able to be bagged easily, eventually it was determined the problem was clogging of filters due to flolan, Subjective: No events overnight. Current Facility-Administered Medications Medication Dose Route Frequency  TPN ADULT - dextrose 10% amino acid 4.25%   IntraVENous QPM  
 dextrose 5% infusion  100 mL/hr IntraVENous CONTINUOUS  
 dextrose 40% (GLUTOSE) oral gel 1 Tube  15 g Oral PRN  
 glucagon (GLUCAGEN) injection 1 mg  1 mg IntraMUSCular PRN  
 dextrose (D50W) injection syrg 12.5-25 g  25-50 mL IntraVENous PRN  
 insulin regular (NOVOLIN R, HUMULIN R) 100 Units in 0.9% sodium chloride 100 mL infusion  0-50 Units/hr IntraVENous TITRATE  influenza vaccine 2019-20 (6 mos+)(PF) (FLUARIX/FLULAVAL/FLUZONE QUAD) injection 0.5 mL  0.5 mL IntraMUSCular PRIOR TO DISCHARGE  
  TPN ADULT - dextrose 10% amino acid 4.25%   IntraVENous QPM  
 NUTRITIONAL SUPPORT ELECTROLYTE PRN ORDERS   Does Not Apply PRN  
 sodium chloride (NS) flush 5-40 mL  5-40 mL IntraVENous Q8H  
 sodium chloride (NS) flush 5-40 mL  5-40 mL IntraVENous PRN  
 atracurium (TRACRIUM) 1,000 mg in 0.9% sodium chloride 250 mL infusion  0-20 mcg/kg/min IntraVENous TITRATE  dilTIAZem (CARDIZEM) 100 mg in 0.9% sodium chloride (MBP/ADV) 100 mL infusion  0-15 mg/hr IntraVENous TITRATE  carboxymethylcellulose sodium (REFRESH LIQUIGEL) 1 % ophthalmic solution 1 Drop  1 Drop Both Eyes Q4H  
 acetaminophen (TYLENOL) tablet 650 mg  650 mg Per G Tube Q6H PRN  
 amantadine HCl (SYMMETREL) capsule 100 mg  100 mg Per G Tube BID  chlorhexidine (PERIDEX) 0.12 % mouthwash 15 mL  15 mL Swish and Spit Q12H  
 enoxaparin (LOVENOX) injection 90 mg  90 mg SubCUTAneous Q24H  
 glatiramer (COPAXONE) injection 40 mg (Patient Supplied)  40 mg SubCUTAneous Q MON, WED & FRI  hydroxychloroquine (PLAQUENIL) tablet 200 mg  200 mg Per G Tube DAILY  methylPREDNISolone (PF) (SOLU-MEDROL) injection 40 mg  40 mg IntraVENous Q8H  
 PARoxetine (PAXIL) tablet 20 mg  20 mg Per G Tube DAILY  fentaNYL in normal saline (pf) 25 mcg/mL infusion  0-200 mcg/hr IntraVENous TITRATE  propofol (DIPRIVAN) infusion  0-50 mcg/kg/min IntraVENous TITRATE  epoprostenol (FLOLAN) 30,000 ng/mL in diluent for epoprostenol (gly) 50 mL solution  10-50 ng/kg/min (Ideal) Nebulization TITRATE  
 0.9% Sodium Chloride for Flolan Infusion  8 mL/hr Nebulization TITRATE Review of Systems Unobtainable due to patient status. Objective:  
 
Vitals:  
 10/19/19 1000 10/19/19 1012 10/19/19 1028 10/19/19 1043 BP:  113/54 115/57 111/56 Pulse:  66 66 66 Resp:  30 30 30 Temp: 97.9 °F (36.6 °C) SpO2:  (!) 88% (!) 88% (!) 88% Weight:      
Height:      
 
 
 
Intake/Output Summary (Last 24 hours) at 10/19/2019 9283 Last data filed at 10/19/2019 0945 Gross per 24 hour Intake 6082.52 ml Output 845 ml Net 5237.52 ml Ventilator Settings Mode FIO2 Rate Tidal Volume Pressure PEEP PRVC  100 %    340 ml     10 cm H20 Peak airway pressure: 44.8 cm H2O Minute ventilation: 10.7 l/min Physical Exam:         
Constitutional:  Trached and  mechanically ventilated. EENMT:  Sclera clear, pupils equal, oral mucosa moist 
Respiratory: crackles bilateral 
Cardiovascular:  RRR with no M,G,R; 
Gastrointestinal:  soft with no tenderness; positive bowel sounds present Musculoskeletal:  warm with no cyanosis, no lower extremity edema Skin:  no jaundice or ecchymosis Neurologic: no gross neuro deficits Psychiatric:  Sedated and paralyzed LINES:   
ETT, central line DRIPS:   
Propofol 5mcg/kg/min, fentanyl 75 mcg/hr, tracrium Flolan drip- inhalational 
 
CXR:   
 
 
 
 
 
Persistent bilateral infiltrates. ABG:  
Recent Labs 10/18/19 
8969 10/18/19 
0450 10/18/19 
5461 PHI 7.184* 7.208* 7.106* PCO2I 61.8* 64.4* 82.8*  
PO2I 63* 68* 82 HCO3I 23.3 25.7 26.0 LAB Recent Labs 10/19/19 
6985 10/19/19 
8583 10/19/19 
0740 10/19/19 
5556 10/19/19 
0530 GLUCPOC 174* 123* 164* 118* 136* Recent Labs 10/19/19 
7796 10/18/19 
1845 10/18/19 
0340 10/17/19 
5585 WBC  --   --  15.6* 14.5* HGB 9.3* 6.1* 6.5* 7.6* HCT 27.5* 20.6* 22.5* 25.9*  
PLT  --   --  203 241 Recent Labs 10/19/19 
7982 10/19/19 
0631 10/19/19 
0325  10/18/19 
1441 10/18/19 
5042 10/17/19 
0327 * 134* 137   < > 143 147*  --   
K 4.7 4.5 4.6   < > 3.9 4.2  --   
 102 104   < > 108* 112*  --   
CO2 21 22 22   < > 23 28  --   
* 247* 223*   < > 369* 345*  --   
* 161* 143*   < > 149* 143*  --   
CREA 2.61* 2.54* 2.47*   < > 2.50* 2.42*  --   
MG 1.9 1.8 1.8   < > 2.1 2.3 2.5* PHOS  --   --   --   --  3.7 5.1* 4.8*  
CA 6.6* 6.4* 6.1*   < > 6.9* 6.6*  --   
 < > = values in this interval not displayed. No results for input(s): LCAD, LAC in the last 72 hours. Assessment:  (Medical Decision Making) Patient Active Problem List  
Diagnosis Code  Acute respiratory failure with hypoxia (HCC) J96.01  
 Multiple sclerosis ([facility] G35  
 Critical illness polyneuropathy (HCC) G62.81  New onset atrial fibrillation (HCC) I48.91  
 Scleroderma ([facility] M34.9  Fever R50.9  Hypernatremia E87.0  Anemia D64.9  
 History of pulmonary embolism Z86.711  
 Diarrhea R19.7  Diabetes ([facility] E11.9  GERD (gastroesophageal reflux disease) K21.9  Anxiety F41.9  Depression F32.9  Hyperlipidemia E78.5  
 HTN (hypertension) I10  
 SADIE (obstructive sleep apnea) G47.33  
 MAE (acute kidney injury) ([facility] N17.9  Osteoarthritis M19.90  Pulmonary hypertension (HCC) I27.20  
 History of esophagitis Z87.19  Long term (current) use of systemic steroids Z79.52  
 Rhinovirus infection B34.8  ARDS (adult respiratory distress syndrome) ([facility] J80  Pneumonia due to infectious organism J18.9 Plan:  (Medical Decision Making) Hospital Problems  Never Reviewed Codes Class Noted POA * (Principal) Acute respiratory failure with hypoxia ([facility] ICD-10-CM: J96.01 
ICD-9-CM: 518.81  10/14/2019 Yes Critically ill on 85% fio2 and inhaled flolan P:F 80>>61 Bilateral infiltrates persist 
Continue to wean FiO2 as tolerated- unsuccessfull so far. Continue Flolan No proneing for now with current parameters- prone if oxigenation is worse. Now with proressive renal failure Lasix 100mg IVP given if ineffective will start on bumex drip at 1mg/hr. Multiple sclerosis ([facility] (Chronic) ICD-10-CM: G35 
ICD-9-CM: 340  10/14/2019 Yes Critical illness polyneuropathy ([facility] ICD-10-CM: E00.96 ICD-9-CM: 357.82  10/14/2019 Yes New onset atrial fibrillation [facility]) ICD-10-CM: I48.91 
ICD-9-CM: 427.31  10/14/2019 Yes  Now back in SR  
 Scleroderma (HCC) (Chronic) ICD-10-CM: M34.9 ICD-9-CM: 710.1  10/14/2019 Yes Fever ICD-10-CM: R50.9 ICD-9-CM: 780.60  10/14/2019 Yes Flagyl Cefepime ID following Hypernatremia ICD-10-CM: E87.0 ICD-9-CM: 276.0  10/14/2019 Yes  
 149>>147>>147- added D5w since PEG unusable(large residuals, billious vomitus)>>>135. On insulin drip for hyperglycemia- BS at goal 
  
 Anemia ICD-10-CM: D64.9 ICD-9-CM: 285.9  10/14/2019 Yes History of pulmonary embolism ICD-10-CM: Z86.711 ICD-9-CM: V12.55  10/14/2019 Yes Overview Signed 10/14/2019  1:46 PM by Dunia Mcneal NP Dx June 2019 Diarrhea ICD-10-CM: R19.7 ICD-9-CM: 787.91  10/14/2019 Yes Diabetes ([facility] (Chronic) ICD-10-CM: E11.9 ICD-9-CM: 250.00  10/14/2019 Yes HTN (hypertension) (Chronic) ICD-10-CM: I10 
ICD-9-CM: 401.9  10/14/2019 Yes SADIE (obstructive sleep apnea) (Chronic) ICD-10-CM: G47.33 
ICD-9-CM: 327.23  10/14/2019 Yes Overview Addendum 10/14/2019  1:47 PM by Dunia Mcneal NP  
  PSG 2018 AHI 9.5/hour Home autopap min + 9 max, +15 MAE (acute kidney injury) ([facility] ICD-10-CM: N17.9 ICD-9-CM: 584.9  10/14/2019 Yes Stable cr 2.25 Pulmonary hypertension (HCC) (Chronic) ICD-10-CM: I27.20 ICD-9-CM: 416.8  10/14/2019 Yes Long term (current) use of systemic steroids (Chronic) ICD-10-CM: Z79.52 
ICD-9-CM: V58.65  10/14/2019 Yes Pneumonia due to rhinovirus- supportive care Critical care time 30 minutes Long discussion with sister and son regarding prognosis 10/17/19 Discussed DNR status and comfort measures- they would like DNR status for now and will think on palliative deescalatioin and comfort care- likely Monday. Prognosis is very poor. More than 50% of the time documented was spent in face-to-face contact with the patient and in the care of the patient on the floor/unit where the patient is located.  
 
Neal Ruelas MD

## 2019-10-19 NOTE — PROGRESS NOTES
Ventilator check complete; patient has a #8.0 tracheostomy. Patient is sedated. Patient is not able to follow commands. Breath sounds are diminished. Trachea is midline, Negative for subcutaneous air, and chest excursion is symmetric. Patient is also Negative for cyanosis and is Negative for pitting edema. All alarms are set and audible. Resuscitation bag is at the head of the bed. Ventilator Settings Mode FIO2 Rate Tidal Volume Pressure PEEP I:E Ratio PRVC  90 %    340 ml     10 cm H20  1:1   
 
Peak airway pressure: 45.8 cm H2O Minute ventilation: 10.6 l/min ABG: No results for input(s): PH, PCO2, PO2, HCO3 in the last 72 hours.  
 
 
Ashley Crawley, RT

## 2019-10-19 NOTE — PROGRESS NOTES
Ventilator check complete; patient has a #8.0 tracheostomy. Patient is sedated. Patient is not able to follow commands. Breath sounds are diminished. Trachea is midline, Negative for subcutaneous air, and chest excursion is symmetric. Patient is also Negative for cyanosis. All alarms are set and audible. Resuscitation bag is at the head of the bed. Ventilator Settings Mode FIO2 Rate Tidal Volume Pressure PEEP I:E Ratio PRVC  100 %   30 340 ml     10 cm H20  1:1.54 Peak airway pressure: 44.8 cm H2O Minute ventilation: 10.7 l/min ABG: No results for input(s): PH, PCO2, PO2, HCO3 in the last 72 hours.  
 
 
Adelita Marquez, RT

## 2019-10-19 NOTE — PROGRESS NOTES
Morning ABG was drawn, but is not crossing over. ABG results: pH 7.16 pCO2 54 PO2 60 HCO3 19.2. MD notified of results and no new orders at this time.

## 2019-10-19 NOTE — PROGRESS NOTES
Nutrition F/U: TPN Management Remains ventilated, paralyzed and sedated. POC Glucoses: 105-390 mg/dl, improved with insulin drip. Received 173 units from insuin drip yesterday. Remains on D5W at 100 ml/hr. Na 134 so expect this may be stopped today. Mg 1.8-current TPN contains no Mg. Would benefit from addition of Mg to TPN as will have Mg wasting with order for 100 mg lasix dose today. Trig 643 mg/dl during lipid infusion last night. Could be r/t to infusion but need to stop lipids for now. Continues to receive Diprivan-currently at 1.5 ml/hr provides minimal IV fat. Intervention: 
Stop IV lipids Check serum Trig Monday AM 
Add 8 meq Mg/L of TPN or ~13 meq/d. Hold off on adding Na to TPN in anticipation of adjustment of D5W IVF. Insulin drip per Glucostabilizer. Phos level in am-at risk for hypophosphatemia with no Phos in TPN and large amount of insulin administration. Cristela Marsh, [address], [address] [place] [address], [address] [place] [address]

## 2019-10-19 NOTE — PROGRESS NOTES
[facility] PROGRESS NOTE Follow up for: MAE over ckd Subjective:  
Patient seen and examined. Chart, notes, labs, imaging, results all reviewed. Intubated and paralyzed No family present today ROS: 
UTO Objective:  
Exam: 
Vitals:  
 10/19/19 0306 10/19/19 0628 10/19/19 0715 10/19/19 0730 BP:   130/60 Pulse: 72  69 68 Resp: 30  18 30 Temp:   98.5 °F (36.9 °C) SpO2: 91%  (!) 89% 90% Weight:  89.9 kg (198 lb 3.1 oz) 92 kg (202 lb 13.2 oz) Height:      
 
 
 
Intake/Output Summary (Last 24 hours) at 10/19/2019 3098 Last data filed at 10/19/2019 4594 Gross per 24 hour Intake 6082.52 ml Output 805 ml Net 5277.52 ml Current Facility-Administered Medications Medication Dose Route Frequency  0.9% sodium chloride infusion 250 mL  250 mL IntraVENous PRN  
 dextrose 5% infusion  100 mL/hr IntraVENous CONTINUOUS  
 fat emulsion 20% (LIPOSYN, INTRAlipid) infusion 250 mL  250 mL IntraVENous QPM  
 dextrose 40% (GLUTOSE) oral gel 1 Tube  15 g Oral PRN  
 glucagon (GLUCAGEN) injection 1 mg  1 mg IntraMUSCular PRN  
 dextrose (D50W) injection syrg 12.5-25 g  25-50 mL IntraVENous PRN  
 insulin regular (NOVOLIN R, HUMULIN R) 100 Units in 0.9% sodium chloride 100 mL infusion  0-50 Units/hr IntraVENous TITRATE  influenza vaccine 2019-20 (6 mos+)(PF) (FLUARIX/FLULAVAL/FLUZONE QUAD) injection 0.5 mL  0.5 mL IntraMUSCular PRIOR TO DISCHARGE  TPN ADULT - dextrose 10% amino acid 4.25%   IntraVENous QPM  
 0.9% sodium chloride infusion 250 mL  250 mL IntraVENous PRN  
 NUTRITIONAL SUPPORT ELECTROLYTE PRN ORDERS   Does Not Apply PRN  
 sodium chloride (NS) flush 5-40 mL  5-40 mL IntraVENous Q8H  
 sodium chloride (NS) flush 5-40 mL  5-40 mL IntraVENous PRN  
 atracurium (TRACRIUM) 1,000 mg in 0.9% sodium chloride 250 mL infusion  0-20 mcg/kg/min IntraVENous TITRATE  dilTIAZem (CARDIZEM) 100 mg in 0.9% sodium chloride (MBP/ADV) 100 mL infusion  0-15 mg/hr IntraVENous TITRATE  carboxymethylcellulose sodium (REFRESH LIQUIGEL) 1 % ophthalmic solution 1 Drop  1 Drop Both Eyes Q4H  
 acetaminophen (TYLENOL) tablet 650 mg  650 mg Per G Tube Q6H PRN  
 amantadine HCl (SYMMETREL) capsule 100 mg  100 mg Per G Tube BID  chlorhexidine (PERIDEX) 0.12 % mouthwash 15 mL  15 mL Swish and Spit Q12H  
 enoxaparin (LOVENOX) injection 90 mg  90 mg SubCUTAneous Q24H  
 glatiramer (COPAXONE) injection 40 mg (Patient Supplied)  40 mg SubCUTAneous Q MON, WED & FRI  hydroxychloroquine (PLAQUENIL) tablet 200 mg  200 mg Per G Tube DAILY  methylPREDNISolone (PF) (SOLU-MEDROL) injection 40 mg  40 mg IntraVENous Q8H  
 PARoxetine (PAXIL) tablet 20 mg  20 mg Per G Tube DAILY  fentaNYL in normal saline (pf) 25 mcg/mL infusion  0-200 mcg/hr IntraVENous TITRATE  propofol (DIPRIVAN) infusion  0-50 mcg/kg/min IntraVENous TITRATE  epoprostenol (FLOLAN) 30,000 ng/mL in diluent for epoprostenol (gly) 50 mL solution  10-50 ng/kg/min (Ideal) Nebulization TITRATE  
 0.9% Sodium Chloride for Flolan Infusion  8 mL/hr Nebulization TITRATE  
 
 
EXAM 
GEN - intubated CV - S1, S2, RRR, no rub, murmur, or gallop Lung - clear to auscultation bilaterally Abd - soft, nontender, BS present Ext - edema present Recent Labs 10/19/19 
4345 10/18/19 
1845 10/18/19 
0340 10/17/19 
5494 WBC  --   --  15.6* 14.5* HGB 9.3* 6.1* 6.5* 7.6* HCT 27.5* 20.6* 22.5* 25.9*  
PLT  --   --  203 241 Recent Labs 10/19/19 
0631 10/19/19 
0867 10/19/19 
0036  10/18/19 
1441 10/18/19 
3355 10/17/19 
0327 * 137 138   < > 143 147*  --   
K 4.5 4.6 4.2   < > 3.9 4.2  --   
 104 105   < > 108* 112*  --   
CO2 22 22 22   < > 23 28  --   
* 143* 165*   < > 149* 143*  --   
CREA 2.54* 2.47* 2.54*   < > 2.50* 2.42*  --   
CA 6.4* 6.1* 6.6*   < > 6.9* 6.6*  --   
* 223* 235*   < > 369* 345*  --   
MG 1.8 1.8 1.8   < > 2.1 2.3 2.5*  
 PHOS  --   --   --   --  3.7 5.1* 4.8*  
 < > = values in this interval not displayed. Assessment and Plan: 1. Solomon over CKD , non oliguric - SOLOMON possibly secondary to ATN from poor hemodynamic status  
-  repeat UA - mild proteinuria and  Hematuria present , no active sediment  
- will use Lasix today secondary to poor oxygenation Electrolytes wnl ,  
- Anasarca from severe hypoalbuminemia present  
 
 
- discussed with family about the pt current renal status and explained that dialysis may not improve any of her critical illness at this time as she continues to have severe ARDS/MS related and CI polyneuropathy and asked them  about their plans for further care . family agreed that the pt has been going through severe medical issues for several months now and has declined significantly . They also agreed upon not doing any further futile aggressive measures  
  
2. ARDS On solumedrol and vent  
  
3. MS Georgette Austin On glatiramer  
 
 
  
Joni Humphrey MD

## 2019-10-20 LAB
ABO + RH BLD: NORMAL
BLD PROD TYP BPU: NORMAL
BLOOD GROUP ANTIBODIES SERPL: NORMAL
BPU ID: NORMAL
CROSSMATCH RESULT,%XM: NORMAL
SPECIMEN EXP DATE BLD: NORMAL
STATUS OF UNIT,%ST: NORMAL
UNIT DIVISION, %UDIV: 0

## 2019-10-20 NOTE — PROGRESS NOTES
Sister Elisabeth, [phone] called and informed of patient's demise. Confirmed mortuary information [address] [place] [address] [phone]. Sister will call children and inform them.

## 2019-10-20 NOTE — PROGRESS NOTES
Noted EKG change on monitor. Patient has been hypoxic despite all interventions. Dr Phillip SOLORZANO aware. Has talked to sister and update given on condition.

## 2019-10-21 LAB
GLUCOSE BLD STRIP.AUTO-MCNC: 196 MG/DL (ref 65–100)
GLUCOSE BLD STRIP.AUTO-MCNC: 307 MG/DL (ref 65–100)

## 2019-10-24 LAB
ARTERIAL PATENCY WRIST A: POSITIVE
BDY SITE: ABNORMAL
GAS FLOW.O2 SETTING OXYMISER: 30 BPM
GAS FLOW.O2 SETTING OXYMISER: 30 L/M
HCO3 BLD-SCNC: 19.2 MMOL/L (ref 22–26)
O2/TOTAL GAS SETTING VFR VENT: 95 %
PCO2 BLD: 54 MMHG (ref 35–45)
PEEP RESPIRATORY: 10 CMH2O
PH BLD: 7.16 [PH] (ref 7.35–7.45)
PO2 BLD: 60 MMHG (ref 80–100)
SAO2 % BLD: 82 % (ref 92–97)
SERVICE CMNT-IMP: ABNORMAL
VENTILATION MODE VENT: ABNORMAL
VT SETTING VENT: 340 ML

## 2019-10-28 NOTE — DISCHARGE SUMMARY
Discharge Note Patient: Paul De La Rosa                 MRN: [id number] YOB: 1945   Age: 69 y.o. Sex: female Admission date:  10/14/2019 Discharge date:  10/19/2019 Admitting Diagnosis:  Acute respiratory failure with hypoxia ([facility] [J96.01] Discharge Diagnoses:   
Hospital Problems as of 10/19/2019 Never Reviewed Codes Class Noted - Resolved POA * (Principal) Acute respiratory failure with hypoxia ([facility] ICD-10-CM: J96.01 
ICD-9-CM: 518.81  10/14/2019 - Present Yes Diabetes ([place] [address].) (Chronic) ICD-10-CM: E11.9 ICD-9-CM: 250.00  10/14/2019 - Present Yes Fever ICD-10-CM: R50.9 ICD-9-CM: 780.60  10/14/2019 - Present Yes New onset atrial fibrillation [facility]) ICD-10-CM: I48.91 
ICD-9-CM: 427.31  10/14/2019 - Present Yes Critical illness polyneuropathy ([facility] ICD-10-CM: J88.23 ICD-9-CM: 357.82  10/14/2019 - Present Yes Hypernatremia ICD-10-CM: E87.0 ICD-9-CM: 276.0  10/14/2019 - Present Yes Sepsis ([place] [address].) ICD-10-CM: A41.9 ICD-9-CM: 038.9, 995.91  10/19/2019 - Present Unknown Rhinovirus infection ICD-10-CM: B34.8 ICD-9-CM: 079.3  10/16/2019 - Present Unknown ARDS (adult respiratory distress syndrome) ([facility] ICD-10-CM: E34 
ICD-9-CM: 518.52  10/16/2019 - Present Unknown Pneumonia due to infectious organism ICD-10-CM: J18.9 ICD-9-CM: 136.9, 484.8  10/16/2019 - Present Unknown Multiple sclerosis ([facility] (Chronic) ICD-10-CM: G35 
ICD-9-CM: 340  10/14/2019 - Present Yes Scleroderma (HCC) (Chronic) ICD-10-CM: M34.9 ICD-9-CM: 710.1  10/14/2019 - Present Yes Anemia ICD-10-CM: D64.9 ICD-9-CM: 285.9  10/14/2019 - Present Yes History of pulmonary embolism ICD-10-CM: Z86.711 ICD-9-CM: V12.55  10/14/2019 - Present Yes Overview Signed 10/14/2019  1:46 PM by Ayesha Diego NP Dx June 2019 Diarrhea ICD-10-CM: R19.7 ICD-9-CM: 787.91  10/14/2019 - Present Yes HTN (hypertension) (Chronic) ICD-10-CM: I10 
ICD-9-CM: 401.9  10/14/2019 - Present Yes  
   
 SADIE (obstructive sleep apnea) (Chronic) ICD-10-CM: K49.13 
ICD-9-CM: 327.23  10/14/2019 - Present Yes Overview Addendum 10/14/2019  1:47 PM by Karen Wilhelm NP  
  PSG 2018 AHI 9.5/hour Home autopap min + 9 max, +15 MAE (acute kidney injury) ([facility] ICD-10-CM: N17.9 ICD-9-CM: 584.9  10/14/2019 - Present Yes Pulmonary hypertension (HCC) (Chronic) ICD-10-CM: I27.20 ICD-9-CM: 416.8  10/14/2019 - Present Yes Long term (current) use of systemic steroids (Chronic) ICD-10-CM: Z79.52 
ICD-9-CM: V58.65  10/14/2019 - Present Yes Consultants: Infectious [facility] Studies/Procedures: CXR Central line placement Disposition:  Dicharged Condition: NA Hospital course: Transferred to [address] [place] [address] ICU with acute respiratory failure with severe hypoxia. Upon transfer, she was paralyzed and started on Flolan but oxygenation remained a problem. She was followed by the infectious disease team with antibiotics for suspected aspiration pneumonia versus ARDS. Steroids were continued as well. She developed acute renal failure so was seen by nephrology. She was not a candidate for dialysis. The medical staff updated the family and they initially made her a DNR but later chose to compassionately extubate her. She  and was pronounced at 8:11 pm by Dr. Kamran Anders. Other past medical and recent history includes:  
  She had been transferred to the [facility]) two days prior to transfer to [address] [place] [address] but experienced need for increased oxygen support during transport (85% up to 100%). She had been hospitalized at [facility] on  with confusion. She was felt to be septic and had an elevated WBC and lactic acid level.  She was hypoxic and her CXR showed bilateral infiltrates. She was treated for pneumonia (? Bacterial versus viral versus aspiration). The viral panel was positive for rhinovirus. Her CXR worsened on 9/26 and she required intubation on 9/27. She required paralysis for ventilation due to respiratory distress. The pulmonary infiltrates progressed and she was felt to have ARDS. She was started on lasix for suspected volume overload but developed MAE for which she was seen by nephrology. . Echo shows an EF 55 to 60 mmHg.     
  She gradually improved and was extubated on 10/3. She was noted to be confused and profoundly weak so was seen in consultation by neurology. 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 She received 5 doses of IVIG. She had a brain MRI on Oct 4 which showed multiple, probable, MS lesions in the cerebrum and c spine (stable from previous examinations).   
   She developed acute respiratory failure on 10/8 requiring reintubation. She underwent trach and PEG placement on 10/10/19. She had been tube feeding dependent but had not only had diarrhea but high gastric residuals. The TF was placed on hold.   
   She underwent therapeutic bronchoscopy on 10/8 and 10/11 for mucus plugging. Bronchial washings reported enterococcus. She was restarted on antibiotics at the time of transfer to [facility] and recultured. She remained febrile and had a leukocytosis. Procalcitonin was 14.0. She was requiring high amount of peep and FIO2 for oxygenation. She was sedated with Fentanyl. She was seen by cardiology for rapid a fib and cardizem was used for control.   
    Her PMH was significant for multiple sclerosis and scleroderma.  She was followed by Rheumatogy and treated with Plaquenil and 5 mg of Prednisone per day. She was monitored for pulmonary hypertension and RVSP in the past has measured 40 mmHg but more recent echo showed a RVSP of 32 mmHg. She also had SADIE with home CPAP that was started last year. She was diagnosed with a pulmonary embolism in June of 2019. She was on Eliquis as an outpatient. Marguerite Patel NP October 28, 2019 
 
--Total discharge greater than 30 minutes in duration.